# Patient Record
Sex: MALE | Race: WHITE | Employment: FULL TIME | URBAN - METROPOLITAN AREA
[De-identification: names, ages, dates, MRNs, and addresses within clinical notes are randomized per-mention and may not be internally consistent; named-entity substitution may affect disease eponyms.]

---

## 2019-12-25 ENCOUNTER — APPOINTMENT (OUTPATIENT)
Dept: INTERVENTIONAL RADIOLOGY/VASCULAR | Age: 64
DRG: 024 | End: 2019-12-25
Attending: NURSE PRACTITIONER
Payer: COMMERCIAL

## 2019-12-25 ENCOUNTER — HOSPITAL ENCOUNTER (INPATIENT)
Age: 64
LOS: 8 days | Discharge: HOME | DRG: 024 | End: 2020-01-02
Attending: EMERGENCY MEDICINE | Admitting: NEUROLOGICAL SURGERY
Payer: COMMERCIAL

## 2019-12-25 ENCOUNTER — APPOINTMENT (OUTPATIENT)
Dept: CT IMAGING | Age: 64
DRG: 024 | End: 2019-12-25
Attending: EMERGENCY MEDICINE
Payer: COMMERCIAL

## 2019-12-25 ENCOUNTER — APPOINTMENT (OUTPATIENT)
Dept: CT IMAGING | Age: 64
DRG: 024 | End: 2019-12-25
Attending: NURSE PRACTITIONER
Payer: COMMERCIAL

## 2019-12-25 DIAGNOSIS — Z79.4 CONTROLLED TYPE 2 DIABETES MELLITUS WITHOUT COMPLICATION, WITH LONG-TERM CURRENT USE OF INSULIN (HCC): ICD-10-CM

## 2019-12-25 DIAGNOSIS — E11.9 CONTROLLED TYPE 2 DIABETES MELLITUS WITHOUT COMPLICATION, WITH LONG-TERM CURRENT USE OF INSULIN (HCC): ICD-10-CM

## 2019-12-25 DIAGNOSIS — G45.0 TIA INVOLVING BASILAR ARTERY: Primary | ICD-10-CM

## 2019-12-25 DIAGNOSIS — I63.112: ICD-10-CM

## 2019-12-25 DIAGNOSIS — R42 DIZZINESS: ICD-10-CM

## 2019-12-25 LAB
ABO + RH BLD: NORMAL
ALBUMIN SERPL-MCNC: 3.4 G/DL (ref 3.2–4.6)
ALBUMIN SERPL-MCNC: 4.1 G/DL (ref 3.2–4.6)
ALBUMIN/GLOB SERPL: 1.2 {RATIO} (ref 1.2–3.5)
ALBUMIN/GLOB SERPL: 1.2 {RATIO} (ref 1.2–3.5)
ALP SERPL-CCNC: 67 U/L (ref 50–136)
ALP SERPL-CCNC: 87 U/L (ref 50–136)
ALT SERPL-CCNC: 22 U/L (ref 12–65)
ALT SERPL-CCNC: 31 U/L (ref 12–65)
AMPHET UR QL SCN: NEGATIVE
ANION GAP SERPL CALC-SCNC: 7 MMOL/L (ref 7–16)
ANION GAP SERPL CALC-SCNC: 8 MMOL/L (ref 7–16)
AST SERPL-CCNC: 11 U/L (ref 15–37)
AST SERPL-CCNC: 14 U/L (ref 15–37)
BARBITURATES UR QL SCN: NEGATIVE
BASOPHILS # BLD: 0.1 K/UL (ref 0–0.2)
BASOPHILS NFR BLD: 1 % (ref 0–2)
BENZODIAZ UR QL: NEGATIVE
BILIRUB DIRECT SERPL-MCNC: 0.2 MG/DL
BILIRUB SERPL-MCNC: 0.6 MG/DL (ref 0.2–1.1)
BILIRUB SERPL-MCNC: 0.7 MG/DL (ref 0.2–1.1)
BLOOD GROUP ANTIBODIES SERPL: NORMAL
BUN SERPL-MCNC: 18 MG/DL (ref 8–23)
BUN SERPL-MCNC: 20 MG/DL (ref 8–23)
CALCIUM SERPL-MCNC: 8.8 MG/DL (ref 8.3–10.4)
CALCIUM SERPL-MCNC: 9.9 MG/DL (ref 8.3–10.4)
CANNABINOIDS UR QL SCN: NEGATIVE
CHLORIDE SERPL-SCNC: 104 MMOL/L (ref 98–107)
CHLORIDE SERPL-SCNC: 108 MMOL/L (ref 98–107)
CHOLEST SERPL-MCNC: 108 MG/DL
CK MB CFR SERPL CALC: 4.9 %
CK MB SERPL-MCNC: 2.5 NG/ML (ref 0.5–3.6)
CK SERPL-CCNC: 51 U/L (ref 21–215)
CK SERPL-CCNC: 53 U/L (ref 21–215)
CO2 SERPL-SCNC: 27 MMOL/L (ref 21–32)
CO2 SERPL-SCNC: 29 MMOL/L (ref 21–32)
COCAINE UR QL SCN: NEGATIVE
CREAT SERPL-MCNC: 0.96 MG/DL (ref 0.8–1.5)
CREAT SERPL-MCNC: 1.28 MG/DL (ref 0.8–1.5)
DIFFERENTIAL METHOD BLD: ABNORMAL
EOSINOPHIL # BLD: 0.2 K/UL (ref 0–0.8)
EOSINOPHIL NFR BLD: 2 % (ref 0.5–7.8)
ERYTHROCYTE [DISTWIDTH] IN BLOOD BY AUTOMATED COUNT: 12.9 % (ref 11.9–14.6)
EST. AVERAGE GLUCOSE BLD GHB EST-MCNC: 192 MG/DL
GLOBULIN SER CALC-MCNC: 2.8 G/DL (ref 2.3–3.5)
GLOBULIN SER CALC-MCNC: 3.4 G/DL (ref 2.3–3.5)
GLUCOSE BLD STRIP.AUTO-MCNC: 217 MG/DL (ref 65–100)
GLUCOSE BLD STRIP.AUTO-MCNC: 221 MG/DL (ref 65–100)
GLUCOSE SERPL-MCNC: 224 MG/DL (ref 65–100)
GLUCOSE SERPL-MCNC: 236 MG/DL (ref 65–100)
HBA1C MFR BLD: 8.3 % (ref 4.8–6)
HCT VFR BLD AUTO: 52.6 % (ref 41.1–50.3)
HDLC SERPL-MCNC: 32 MG/DL (ref 40–60)
HDLC SERPL: 3.4 {RATIO}
HGB BLD-MCNC: 18.6 G/DL (ref 13.6–17.2)
IMM GRANULOCYTES # BLD AUTO: 0.1 K/UL (ref 0–0.5)
IMM GRANULOCYTES NFR BLD AUTO: 1 % (ref 0–5)
INR BLD: 1 (ref 0.9–1.2)
LDLC SERPL CALC-MCNC: 57.4 MG/DL
LIPID PROFILE,FLP: ABNORMAL
LYMPHOCYTES # BLD: 2.4 K/UL (ref 0.5–4.6)
LYMPHOCYTES NFR BLD: 34 % (ref 13–44)
MAGNESIUM SERPL-MCNC: 1.9 MG/DL (ref 1.8–2.4)
MCH RBC QN AUTO: 30.7 PG (ref 26.1–32.9)
MCHC RBC AUTO-ENTMCNC: 35.4 G/DL (ref 31.4–35)
MCV RBC AUTO: 86.8 FL (ref 79.6–97.8)
METHADONE UR QL: NEGATIVE
MONOCYTES # BLD: 0.5 K/UL (ref 0.1–1.3)
MONOCYTES NFR BLD: 7 % (ref 4–12)
NEUTS SEG # BLD: 3.9 K/UL (ref 1.7–8.2)
NEUTS SEG NFR BLD: 55 % (ref 43–78)
NRBC # BLD: 0 K/UL (ref 0–0.2)
OPIATES UR QL: NEGATIVE
PCP UR QL: NEGATIVE
PLATELET # BLD AUTO: 205 K/UL (ref 150–450)
PMV BLD AUTO: 9.4 FL (ref 9.4–12.3)
POTASSIUM SERPL-SCNC: 4 MMOL/L (ref 3.5–5.1)
POTASSIUM SERPL-SCNC: 4.3 MMOL/L (ref 3.5–5.1)
PROT SERPL-MCNC: 6.2 G/DL (ref 6.3–8.2)
PROT SERPL-MCNC: 7.5 G/DL (ref 6.3–8.2)
PT BLD: 12.4 SECS (ref 9.6–11.6)
RBC # BLD AUTO: 6.06 M/UL (ref 4.23–5.6)
SODIUM SERPL-SCNC: 141 MMOL/L (ref 136–145)
SODIUM SERPL-SCNC: 142 MMOL/L (ref 136–145)
SPECIMEN EXP DATE BLD: NORMAL
TRIGL SERPL-MCNC: 93 MG/DL (ref 35–150)
TROPONIN I SERPL-MCNC: <0.02 NG/ML (ref 0.02–0.05)
VLDLC SERPL CALC-MCNC: 18.6 MG/DL (ref 6–23)
WBC # BLD AUTO: 7 K/UL (ref 4.3–11.1)

## 2019-12-25 PROCEDURE — 86900 BLOOD TYPING SEROLOGIC ABO: CPT

## 2019-12-25 PROCEDURE — 70450 CT HEAD/BRAIN W/O DYE: CPT

## 2019-12-25 PROCEDURE — C1769 GUIDE WIRE: HCPCS

## 2019-12-25 PROCEDURE — 74011636637 HC RX REV CODE- 636/637: Performed by: NURSE PRACTITIONER

## 2019-12-25 PROCEDURE — 74011000258 HC RX REV CODE- 258: Performed by: EMERGENCY MEDICINE

## 2019-12-25 PROCEDURE — B312ZZZ FLUOROSCOPY OF LEFT SUBCLAVIAN ARTERY: ICD-10-PCS | Performed by: NEUROLOGICAL SURGERY

## 2019-12-25 PROCEDURE — 74011636320 HC RX REV CODE- 636/320: Performed by: EMERGENCY MEDICINE

## 2019-12-25 PROCEDURE — C1894 INTRO/SHEATH, NON-LASER: HCPCS

## 2019-12-25 PROCEDURE — L1830 KO IMMOB CANVAS LONG PRE OTS: HCPCS

## 2019-12-25 PROCEDURE — 82962 GLUCOSE BLOOD TEST: CPT

## 2019-12-25 PROCEDURE — 85610 PROTHROMBIN TIME: CPT

## 2019-12-25 PROCEDURE — 36225 PLACE CATH SUBCLAVIAN ART: CPT

## 2019-12-25 PROCEDURE — 65610000001 HC ROOM ICU GENERAL

## 2019-12-25 PROCEDURE — B31FZZZ FLUOROSCOPY OF LEFT VERTEBRAL ARTERY: ICD-10-PCS | Performed by: NEUROLOGICAL SURGERY

## 2019-12-25 PROCEDURE — 74011250636 HC RX REV CODE- 250/636

## 2019-12-25 PROCEDURE — 74011250636 HC RX REV CODE- 250/636: Performed by: NURSE PRACTITIONER

## 2019-12-25 PROCEDURE — 74011250637 HC RX REV CODE- 250/637: Performed by: NURSE PRACTITIONER

## 2019-12-25 PROCEDURE — 037Q3ZZ DILATION OF LEFT VERTEBRAL ARTERY, PERCUTANEOUS APPROACH: ICD-10-PCS | Performed by: NEUROLOGICAL SURGERY

## 2019-12-25 PROCEDURE — C1725 CATH, TRANSLUMIN NON-LASER: HCPCS

## 2019-12-25 PROCEDURE — 0042T CT PERF W CBF: CPT

## 2019-12-25 PROCEDURE — 80061 LIPID PANEL: CPT

## 2019-12-25 PROCEDURE — 77030013519 HC DEV INFL BASIX MRTM -B

## 2019-12-25 PROCEDURE — 80307 DRUG TEST PRSMV CHEM ANLYZR: CPT

## 2019-12-25 PROCEDURE — 83735 ASSAY OF MAGNESIUM: CPT

## 2019-12-25 PROCEDURE — 70496 CT ANGIOGRAPHY HEAD: CPT

## 2019-12-25 PROCEDURE — 77030019605

## 2019-12-25 PROCEDURE — 80053 COMPREHEN METABOLIC PANEL: CPT

## 2019-12-25 PROCEDURE — 85025 COMPLETE CBC W/AUTO DIFF WBC: CPT

## 2019-12-25 PROCEDURE — 84484 ASSAY OF TROPONIN QUANT: CPT

## 2019-12-25 PROCEDURE — 77030004566 HC CATH ANGI DX TORCON COOK -B

## 2019-12-25 PROCEDURE — C1760 CLOSURE DEV, VASC: HCPCS

## 2019-12-25 PROCEDURE — 82550 ASSAY OF CK (CPK): CPT

## 2019-12-25 PROCEDURE — 82553 CREATINE MB FRACTION: CPT

## 2019-12-25 PROCEDURE — C1757 CATH, THROMBECTOMY/EMBOLECT: HCPCS

## 2019-12-25 PROCEDURE — 77030040361 HC SLV COMPR DVT MDII -B

## 2019-12-25 PROCEDURE — 36415 COLL VENOUS BLD VENIPUNCTURE: CPT

## 2019-12-25 PROCEDURE — 80076 HEPATIC FUNCTION PANEL: CPT

## 2019-12-25 PROCEDURE — C1887 CATHETER, GUIDING: HCPCS

## 2019-12-25 PROCEDURE — 83036 HEMOGLOBIN GLYCOSYLATED A1C: CPT

## 2019-12-25 PROCEDURE — 93005 ELECTROCARDIOGRAM TRACING: CPT | Performed by: EMERGENCY MEDICINE

## 2019-12-25 PROCEDURE — 03CQ3ZZ EXTIRPATION OF MATTER FROM LEFT VERTEBRAL ARTERY, PERCUTANEOUS APPROACH: ICD-10-PCS | Performed by: NEUROLOGICAL SURGERY

## 2019-12-25 PROCEDURE — 99285 EMERGENCY DEPT VISIT HI MDM: CPT | Performed by: EMERGENCY MEDICINE

## 2019-12-25 PROCEDURE — 77030012468 HC VLV BLEEDBK CNTRL ABBT -B

## 2019-12-25 PROCEDURE — 77030020263 HC SOL INJ SOD CL0.9% LFCR 1000ML

## 2019-12-25 RX ORDER — ASPIRIN 325 MG
325 TABLET ORAL DAILY
Status: DISCONTINUED | OUTPATIENT
Start: 2019-12-25 | End: 2019-12-30

## 2019-12-25 RX ORDER — FENTANYL CITRATE 50 UG/ML
25-50 INJECTION, SOLUTION INTRAMUSCULAR; INTRAVENOUS
Status: DISCONTINUED | OUTPATIENT
Start: 2019-12-25 | End: 2019-12-25

## 2019-12-25 RX ORDER — ATORVASTATIN CALCIUM 40 MG/1
40 TABLET, FILM COATED ORAL DAILY
Status: DISCONTINUED | OUTPATIENT
Start: 2019-12-26 | End: 2020-01-02 | Stop reason: HOSPADM

## 2019-12-25 RX ORDER — LISINOPRIL 20 MG/1
20 TABLET ORAL DAILY
Status: DISCONTINUED | OUTPATIENT
Start: 2019-12-26 | End: 2020-01-02 | Stop reason: HOSPADM

## 2019-12-25 RX ORDER — CLOPIDOGREL BISULFATE 75 MG/1
75 TABLET ORAL DAILY
Status: DISCONTINUED | OUTPATIENT
Start: 2019-12-25 | End: 2019-12-30

## 2019-12-25 RX ORDER — SODIUM CHLORIDE 0.9 % (FLUSH) 0.9 %
10 SYRINGE (ML) INJECTION
Status: COMPLETED | OUTPATIENT
Start: 2019-12-25 | End: 2019-12-25

## 2019-12-25 RX ORDER — ACETAMINOPHEN 325 MG/1
650 TABLET ORAL
Status: DISCONTINUED | OUTPATIENT
Start: 2019-12-25 | End: 2020-01-02 | Stop reason: HOSPADM

## 2019-12-25 RX ORDER — ONDANSETRON 2 MG/ML
4 INJECTION INTRAMUSCULAR; INTRAVENOUS
Status: DISCONTINUED | OUTPATIENT
Start: 2019-12-25 | End: 2020-01-02 | Stop reason: HOSPADM

## 2019-12-25 RX ORDER — HYDROCODONE BITARTRATE AND ACETAMINOPHEN 7.5; 325 MG/1; MG/1
1 TABLET ORAL
Status: DISCONTINUED | OUTPATIENT
Start: 2019-12-25 | End: 2020-01-02 | Stop reason: HOSPADM

## 2019-12-25 RX ORDER — ONDANSETRON 2 MG/ML
8 INJECTION INTRAMUSCULAR; INTRAVENOUS ONCE
Status: COMPLETED | OUTPATIENT
Start: 2019-12-25 | End: 2019-12-25

## 2019-12-25 RX ORDER — ONDANSETRON 2 MG/ML
INJECTION INTRAMUSCULAR; INTRAVENOUS
Status: COMPLETED
Start: 2019-12-25 | End: 2019-12-25

## 2019-12-25 RX ADMIN — ONDANSETRON 8 MG: 2 INJECTION INTRAMUSCULAR; INTRAVENOUS at 19:24

## 2019-12-25 RX ADMIN — FENTANYL CITRATE 50 MCG: 50 INJECTION, SOLUTION INTRAMUSCULAR; INTRAVENOUS at 17:57

## 2019-12-25 RX ADMIN — Medication 10 ML: at 16:00

## 2019-12-25 RX ADMIN — INSULIN HUMAN 2 UNITS: 100 INJECTION, SOLUTION PARENTERAL at 19:29

## 2019-12-25 RX ADMIN — IOPAMIDOL 100 ML: 755 INJECTION, SOLUTION INTRAVENOUS at 16:00

## 2019-12-25 RX ADMIN — SODIUM CHLORIDE 100 ML: 900 INJECTION, SOLUTION INTRAVENOUS at 16:00

## 2019-12-25 RX ADMIN — CLOPIDOGREL BISULFATE 75 MG: 75 TABLET ORAL at 20:24

## 2019-12-25 RX ADMIN — FENTANYL CITRATE 50 MCG: 50 INJECTION, SOLUTION INTRAMUSCULAR; INTRAVENOUS at 17:15

## 2019-12-25 RX ADMIN — ASPIRIN 325 MG ORAL TABLET 325 MG: 325 PILL ORAL at 20:24

## 2019-12-25 NOTE — H&P
History and Physical    Patient: Kay Vigil MRN: [id number]  SSN: [id number]    YOB: 1955  Age: 59 y.o. Sex: male      Subjective: Kay Vigil is a 59 y.o. male R hand dominant, who presented to the [facility] around 1500 today with right sided weakness, dysarthria and \"thick speech\". Last Known normal was 1400 today. The pt states he was on computer at home and he was unable to use his right hand to move computer mouse, he then noted his right leg to be weak, \"felt like a noodle\" and he was brought into ED by his wife JUNIOR. The pt had initial NIHSS 1 for dysarthria, head CT neg for any acute ICH. The pt was seen by our Tele Neuro team and deemed not a candidate for Activase because they felt the pt was having a TIA. The pt was re-evaluated by ED Dr. Maikol Moseley and pt was unable to ambulate in the ED, he became very dizzy and nauseated. A CTA/CTP was obtained and evaluated by Dr. Yuri Berman, pt found to have left vert clot at origin with stenosis, pt candidate for MEEK and taken emergently for mechanical thrombectomy. Consent obtained prior to procedure with Dr. Larry Nagy.       Past Medical History:   Diagnosis Date    [facility] [address]     Hypertension     MI (myocardial infarction) ([facility] [address] 2002    100% blockage in right coronary artery. Past Surgical History:   Procedure Laterality Date    HX CORONARY STENT PLACEMENT  2002    right coronary stent      Family History   Problem Relation Age of Onset    Heart Disease Father     Heart Attack Father      Social History     Tobacco Use    Smoking status: Former Smoker    Smokeless tobacco: Former User   Substance Use Topics    Alcohol use: No      Prior to Admission medications    Medication Sig Start Date End Date Taking? Authorizing Provider   aspirin delayed-release 81 mg tablet Take  by mouth daily. Provider, Historical   atorvastatin (LIPITOR) 40 mg tablet Take 1 Tab by mouth daily.  11/21/19   Yamila Valenzuela MD   empagliflozin (JARDIANCE) 10 mg tablet Take 1 Tab by mouth daily. 11/21/19   Garo Snow MD   metoprolol succinate (TOPROL-XL) 100 mg tablet TAKE 1 TABLET DAILY 5/9/19   Dutch Wall MD   metFORMIN (GLUCOPHAGE) 500 mg tablet Take 1 Tab by mouth two (2) times daily (with meals). 11/1/18   Dutch Wall MD   lisinopril (PRINIVIL, ZESTRIL) 20 mg tablet Take 1 Tab by mouth daily. 11/1/18   Dutch Wall MD   nitroglycerin (NITROSTAT) 0.4 mg SL tablet 1 Tab by SubLINGual route every five (5) minutes as needed for Chest Pain. 2/2/18   Garo Snow MD   Blood-Glucose Meter monitoring kit TEST DAILY 1/22/18   Dutch Wall MD   glucose blood VI test strips (ASCENSIA AUTODISC VI, ONE TOUCH ULTRA TEST VI) strip TEST DAILY 1/22/18   Dutch Wall MD   Lancets misc TEST DAILY 1/22/18   Dutch Wall MD        Allergies   Allergen Reactions    Sulfur Unknown (comments)       Review of Systems:  Constitutional: well nourished male in NAD  Eyes:  no change in visual acuity, no photophobia  Ears, nose, mouth, throat, and face: no  Odynphagia, dysphagia, no thrush or exudate, negative for chronic sinus congestion, recurrent headaches  Respiratory: negative for SOB, hemoptysis or cough  Cardiovascular: negative for CP, palpitations, or PND  Gastrointestinal: negative for abdominal pain, no hematemesis, hematochezia or BRBPR  Genitourinary: no urgency, frequency, or dysuria, no nocturia  Integument/breast: negative for skin rash or skin lesions  Hematologic/lymphatic: negative for known bleeding disorder  Musculoskeletal:negative for joint pain or joint tenderness  Neurological: dizziness, nausea, right arm \"heaviness\", right leg weak with slurred speech.    Behavioral/Psych: negative for depression or chronic anxiety,       Objective:     Vitals:    12/25/19 1740 12/25/19 1745 12/25/19 1750 12/25/19 1755   BP: 134/69 142/73 130/74 133/71   Pulse: 71 65 69 69   Resp: 16 14 16 16   Temp: SpO2: 96% 96% 95% 95%   Weight:       Height:            Physical Exam:  General:  Alert, cooperative, no distress, appears stated age. HEENT: Supple, symmetrical, trachea midline, no adenopathy, thyroid: no enlargment/tenderness/nodules, no carotid bruit and no JVD. +6th nerve R.   Lungs:   Clear to auscultation bilaterally. Heart:  Regular rate and rhythm, S1, S2 normal, no murmur, click, rub or gallop. Abdomen:   Soft, non-tender. Bowel sounds normal. No masses,  No organomegaly. Extremities: Extremities normal, atraumatic, no cyanosis or edema. Pulses: 2+ and symmetric all extremities. Skin: Skin color, texture, turgor normal. No rashes or lesions   Neurologic: Pt aox3, will follow commands. RUE mild drift, pt states paresthesia. Speech mild dysarthria. 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 Pt with left vert artery stenosis and thrombus. Pt NIHSS 1-2 on admit and remains 1 on admit to ICU. Pt is aox3, follows commands and zarco. Pt does have noted fine motor deficits on RUE and speech is \"thick\". Pt is able to swallow and maintain airway. PT/OT/ST consulted. Post MEEK CT head is stable. Pt with dissection left vert during procedure. Pt started on asa/plavix. Will repeat imaging in am: CT head/CTA head and neck and CTP. Will order MRI brain. RESP:pt tolerating room air. CV:SBP goal <180. Trop neg. 2D Echo pending. Trop neg. SCD. Asa/plavix. HEME: HH: 10/52,   NEPH: bun/cr: 20/1.28  GI:NPO on admit. Will do STAND for swallow eval, ST consulted. IVF's @75cc/hr. ID:afebrile, no abx  LINES:IVx2, bland. Dr. Diaz Esposito discussed plan of care with pt and wife.      Signed By: Zahraa Barrett NP     December 25, 2019

## 2019-12-25 NOTE — PROGRESS NOTES
Sachi Monreal [address] [facility]      NEUROVASCULAR SURGERY - ATTENDING PROGRESS NOTE    I have personally seen and examined the patient. I spoke to wife and son regarding radiographic diagnosis of occlusion of the left vertebral artery at the origin, which is his dominant vertebral artery. He continues to exhibit some posterior circulation symptoms but it is unclear whether these symptoms are from hypoperfusion or  infarcts. Given his young age, I recommended proceeding with revascularization of his left vertebral artery origin. There appears to be an associated stenosis. Wife agrees to proceed.      Yecenia Verduzco MD, Nani Shearer, [facility]   Neurological Surgery  Cerebrovascular and [facility]

## 2019-12-25 NOTE — PROCEDURES
Sachi Monreal [address] [facility]      NEUROVASCULAR SURGERY - OPERATIVE NOTE    PRE-OPERATIVE DIAGNOSIS:  Left vertebral artery occlusion    POST-OPERATIVE DIAGNOSIS:  Left vertebral artery occlusion    PROCEDURE(S) PERFORMED:  Right common femoral artery access  Left subclavian artery angiogram  Left vertebral artery angiogram  Endovascular mechanical thrombectomy, left vertebral artery  Angioplasty, left vertebral artery  Date of Service: 12/25/2019    Surgeon(s):  Natalie Azar MD    Assistant(s):  Fritz Troy MSN, [facility]    Anesthesia: Conscious sedation, 45 minutes. I personally supervised administration of sedation. Medications: No heparin used. Contrast: Omnipaque 120 mL    Access: Right common femoral artery, 6F sheath. Arteriotomy closed with 8FAngioseal. Flat x 2 hours. Implants: none     INDICATIONS: Mr. Marion Monroe is a 59y.o. year-old male who presents with stroke symptoms and was found to have occlusion of his left vertebral artery. I am indicating him for emergent endovascular thrombectomy to try to restore blood flow to the brain. I spoke to the patient and his family regarding the details of the procedure, the associated risks and potential complications, and the anticipated post-operative course. I answered their questions and they have elected to proceed. The surgical consent was signed and placed in the chart. FINDINGS: Acute occlusion of the origin of the left vertebral artery in the setting of a critical stenosis. This was easily revascularized with aspiration using a single pass to [address] [facility]. Once this was done, the 90% stenosis remained and this was opened with a 4 x 15 mm Viatrac balloon. A small non-flow limiting dissection was noted after angioplasty. Good antegrade flow is demonstrated at the conclusion of the procedure. COMPLICATIONS: None.     EQUIPMENT USED:   1. 6F short sheath  2. 6F Flexor shuttle sheath, 90 cm  3. 5F diagnostic catheter, 125 cm  4. 0.038\" Glidewire, 260 cm  5. 0.035\" Glidewire, 150 cm  6. 4 x 15 mm Viatrac balloon  7. Synchro2 wire  8. 8F Angioseal    PROCEDURAL DETAILS: After the patient was properly identified in the pre-operative area, they were transferred into the operating room and placed supine on the operating table. All pressure points were padded appropriately and the patient was connected to the physiologic monitoring system. After verifying the radiologic equipment to be in working order, the right and left groin areas were prepped and draped in the standard surgical fashion. A surgical team pause was then performed with all team members in agreement. After infiltration of local anesthetic, the right common femoral artery was then accessed with a 21 gauge needle. The arteriotomy was then dilated up to a 6F sheath, which was then flushed and attached to a pressurized heparinized saline drip. This was then exchanged for a 6F shuttle into the descending thoracic aorta and I flushed the system. Using the diagnostic catheter, the left subclavian artery was then catheterized. Digital subtraction angiography was then performed at this time of the cervical and intracranial  circulation, demonstrating the occlusion at the origin of the left vertebral artery. I then used the Glidewire and diagnostic catheter to gently pass through the occlusion and brought the shuttle to the face of the occlusion and performed aspiration. This opened up the vertebral artery and demonstrated the 90% stenosis at its origin. I was then able to cross the stenosis with a 4 x 15 mm Viatrac balloon over a Synchro2 wire and serially dilate the stenosis. Post-angioplasty control angiography demonstrates a small non-flow limiting dissection with brisk antegrade flow. At the conclusion of the procedure, an external iliac angiogram was performed demonstrating a puncture site suitable for closure.  The Angioseal was then deployed without difficulty. Adequate hemostasis was obtained and the wound was dressed in the standard sterile fashion. The patient tolerated the procedure well and was transported out of the operating room in stable condition. DIAGNOSTIC ANGIOGRAPHY AND INTERVENTION, SUPERVISION AND INTERPRETATION:    LEFT SUBCLAVIAN ARTERY: Under fluoroscopic guidance, the diagnostic catheter was navigated from the aortic arch into the left subclavian artery and DSA performed in the AP plane. The left vertebral artery is not visualized, consistent with an acute occlusion. The thyrocervical and deep cervical trunks are visualized and without abnormality. LEFT VERTEBRAL ARTERY: Under fluoroscopic guidance, the diagnostic catheter was navigated into the left vertebral artery. Digital subtraction angiography was then performed. This demonstrates brisk antegrade flow into the intracranial circulation. No aneurysms are identified. There is no venous shunting. The anatomic course of the basilar artery and named cerebellar arteries appear normal. The left vertebral artery is dominant. The capillary phase and venous structures appear without significant abnormality. THROMBECTOMY: The aspiration catheter was brought to the face of the thrombus and aspiration performed. CONTROL ANGIOGRAPHY:  First control angiogram from the left subclavian artery demonstrates antegrade flow and opening of the left vertebral artery but with a 90% stenosis at the origin. The second control angiogram is performed after angioplasty demonstrating marked reduction of the stenosis and brisk antegrade flow. There appears to be a small dissection stemming from the origin of the vertebral artery into the proximal V2 segment that is not flow limiting. ANGIOPLASTY: The stenosis is crossed with a Synchro2 wire and a 4 x 15 mm Viatrac balloon and angioplasty performed.     EXTERNAL ILIAC ARTERY: The sheath is then pulled back into the external iliac artery and digital subtraction angiography is performed. This demonstrates an arteriotomy site that is above the femoral bifurcation and below the inferior epigastric artery. No extravasation is noted. No aneurysms are noted. The anatomy here is relatively free of significant atheromatous disease. The arteriotomy site appears amenable to the use of a closure device.      Wagner Russell MD, Frank Roque, [facility]   Neurological Surgery  Cerebrovascular and [address] [facility]

## 2019-12-25 NOTE — PROGRESS NOTES
Pt taken to CT for [address] [facility] [address]    1521 CT completed. Pt taken back to ER for tele neuro evaluation. Pt NIH 1 for dysarthria. 1537  Tele neuro consult started. [address] consult completed. Dr. Aishwarya Merida to bedside. Attempted to ambulate patient. Pt ataxic and unable to release bed rail to walk. Cristine Hoffmann, notified. Recommendation obtained for CTA/CTP. Dr. Aishwarya Merida notified and studies ordered.

## 2019-12-25 NOTE — ED TRIAGE NOTES
Pt arrives POV with wife. States about an hour ago pt started to have trouble speaking. Pt states he feels like his right arm is getting raul but that his leg (right) is on. Pt also having slurred speech noted in triage.  Dr. Olivia Goyal evaluating

## 2019-12-25 NOTE — ED PROVIDER NOTES
Pt arrives POV with wife. States about an hour ago pt started to have trouble speaking. Pt states he feels like his right arm is getting raul but that his leg (right) is on. Patient and spouse deny prior occurrence. The patient states he feels as though his right leg is not working right at this time. He denies any headache or vision changes or recent changes in medications. He does have a history of coronary artery disease with stent placement in the past.  He also has a history of diabetes and is not currently taking any anticoagulants. The history is provided by the patient, the spouse and medical records. Extremity Weakness    This is a new problem. The current episode started less than 1 hour ago. The problem has been gradually improving. The pain is present in the right arm, right lower leg and right upper leg. The patient is experiencing no pain. Pertinent negatives include no numbness, full range of motion, no stiffness, no tingling, no itching, no back pain and no neck pain. He has tried nothing for the symptoms. The treatment provided no relief. There has been no history of extremity trauma. Past Medical History:   Diagnosis Date    Diabetes ([address]     Hypertension     MI (myocardial infarction) ([facility] [address] 2002    100% blockage in right coronary artery.        Past Surgical History:   Procedure Laterality Date    HX CORONARY STENT PLACEMENT  2002    right coronary stent         Family History:   Problem Relation Age of Onset    Heart Disease Father     Heart Attack Father        Social History     Socioeconomic History    Marital status:      Spouse name: Not on file    Number of children: Not on file    Years of education: Not on file    Highest education level: Not on file   Occupational History    Not on file   Social Needs    Financial resource strain: Not on file    Food insecurity:     Worry: Not on file     Inability: Not on file    Transportation needs:     Medical: Not on file     Non-medical: Not on file   Tobacco Use    Smoking status: Former Smoker    Smokeless tobacco: Former User   Substance and Sexual Activity    Alcohol use: No    Drug use: No    Sexual activity: Not on file   Lifestyle    Physical activity:     Days per week: Not on file     Minutes per session: Not on file    Stress: Not on file   Relationships    Social connections:     Talks on phone: Not on file     Gets together: Not on file     Attends [employer] service: Not on file     Active member of club or organization: Not on file     Attends meetings of clubs or organizations: Not on file     Relationship status: Not on file    Intimate partner violence:     Fear of current or ex partner: Not on file     Emotionally abused: Not on file     Physically abused: Not on file     Forced sexual activity: Not on file   Other Topics Concern    Not on file   Social History Narrative    Not on file         ALLERGIES: Sulfur    Review of Systems   Musculoskeletal: Negative for back pain, neck pain and stiffness. Skin: Negative for itching. Neurological: Positive for speech difficulty and weakness. Negative for tingling and numbness. All other systems reviewed and are negative. Vitals:    12/25/19 1517   BP: (!) 167/99   Pulse: 72   Resp: 18   Temp: 98.5 °F (36.9 °C)   SpO2: 96%   Weight: 102.1 kg (225 lb)   Height: 6' 1\" (1.854 m)            Physical Exam  Vitals signs and nursing note reviewed. Constitutional:       General: He is not in acute distress. Appearance: Normal appearance. He is normal weight. He is not ill-appearing, toxic-appearing or diaphoretic. HENT:      Head: Normocephalic and atraumatic. Nose: Nose normal.      Mouth/Throat:      Mouth: Mucous membranes are moist.   Eyes:      Extraocular Movements: Extraocular movements intact. Conjunctiva/sclera: Conjunctivae normal.      Pupils: Pupils are equal, round, and reactive to light.    Neck:      Musculoskeletal: Normal range of motion and neck supple. Cardiovascular:      Rate and Rhythm: Normal rate and regular rhythm. Pulses: Normal pulses. Heart sounds: Normal heart sounds. Pulmonary:      Effort: Pulmonary effort is normal.      Breath sounds: Normal breath sounds. Abdominal:      General: Abdomen is flat. Tenderness: There is no tenderness. Musculoskeletal: Normal range of motion. Skin:     General: Skin is warm and dry. Neurological:      General: No focal deficit present. Mental Status: He is alert and oriented to person, place, and time. Mental status is at baseline. Cranial Nerves: No cranial nerve deficit. Sensory: No sensory deficit. Motor: Weakness present. Coordination: Coordination abnormal.      Gait: Gait abnormal.      Deep Tendon Reflexes: Reflexes normal.      Comments: Left lower extremity weakness is noted. There appears to be no drift of the upper extremities or incoordination. No dysarthria noted.    Psychiatric:         Mood and Affect: Mood normal.         Behavior: Behavior normal.          MDM  Number of Diagnoses or Management Options     Amount and/or Complexity of Data Reviewed  Clinical lab tests: ordered and reviewed  Tests in the radiology section of CPT®: ordered and reviewed  Review and summarize past medical records: yes  Discuss the patient with other providers: yes  Independent visualization of images, tracings, or specimens: yes    Risk of Complications, Morbidity, and/or Mortality  Presenting problems: high  Diagnostic procedures: moderate  Management options: moderate    Patient Progress  Patient progress: stable         Procedures

## 2019-12-25 NOTE — PROGRESS NOTES
[facility] Dual nuero assessment complete with Merle bender rn and carlee, rn and offgoing rn. ST NAKITA NP at bedside with patient and family.  [facility] 1

## 2019-12-25 NOTE — PROGRESS NOTES
Pt to IR holding bay with this RN and JACKY Campos RN. Awaiting results of CTA/CTP. NIH remains 1 for mild dysarthria. Pt report dizziness at this time with position change. Pedal pulses marked. 20 g iv placed to left FA.

## 2019-12-26 ENCOUNTER — APPOINTMENT (OUTPATIENT)
Dept: CT IMAGING | Age: 64
DRG: 024 | End: 2019-12-26
Attending: NURSE PRACTITIONER
Payer: COMMERCIAL

## 2019-12-26 ENCOUNTER — APPOINTMENT (OUTPATIENT)
Dept: MRI IMAGING | Age: 64
DRG: 024 | End: 2019-12-26
Attending: NURSE PRACTITIONER
Payer: COMMERCIAL

## 2019-12-26 LAB
ANION GAP SERPL CALC-SCNC: 6 MMOL/L (ref 7–16)
ATRIAL RATE: 76 BPM
BUN SERPL-MCNC: 15 MG/DL (ref 8–23)
CALCIUM SERPL-MCNC: 9.1 MG/DL (ref 8.3–10.4)
CALCULATED P AXIS, ECG09: 62 DEGREES
CALCULATED R AXIS, ECG10: -52 DEGREES
CALCULATED T AXIS, ECG11: 8 DEGREES
CHLORIDE SERPL-SCNC: 109 MMOL/L (ref 98–107)
CO2 SERPL-SCNC: 27 MMOL/L (ref 21–32)
CREAT SERPL-MCNC: 0.92 MG/DL (ref 0.8–1.5)
DIAGNOSIS, 93000: NORMAL
ERYTHROCYTE [DISTWIDTH] IN BLOOD BY AUTOMATED COUNT: 13 % (ref 11.9–14.6)
GLUCOSE BLD STRIP.AUTO-MCNC: 135 MG/DL (ref 65–100)
GLUCOSE BLD STRIP.AUTO-MCNC: 143 MG/DL (ref 65–100)
GLUCOSE BLD STRIP.AUTO-MCNC: 165 MG/DL (ref 65–100)
GLUCOSE BLD STRIP.AUTO-MCNC: 165 MG/DL (ref 65–100)
GLUCOSE BLD STRIP.AUTO-MCNC: 312 MG/DL (ref 65–100)
GLUCOSE SERPL-MCNC: 135 MG/DL (ref 65–100)
HCT VFR BLD AUTO: 44.3 % (ref 41.1–50.3)
HGB BLD-MCNC: 15.2 G/DL (ref 13.6–17.2)
MAGNESIUM SERPL-MCNC: 2.2 MG/DL (ref 1.8–2.4)
MCH RBC QN AUTO: 30 PG (ref 26.1–32.9)
MCHC RBC AUTO-ENTMCNC: 34.3 G/DL (ref 31.4–35)
MCV RBC AUTO: 87.5 FL (ref 79.6–97.8)
NRBC # BLD: 0 K/UL (ref 0–0.2)
P-R INTERVAL, ECG05: 150 MS
PLATELET # BLD AUTO: 186 K/UL (ref 150–450)
PMV BLD AUTO: 9.8 FL (ref 9.4–12.3)
POTASSIUM SERPL-SCNC: 3.8 MMOL/L (ref 3.5–5.1)
Q-T INTERVAL, ECG07: 388 MS
QRS DURATION, ECG06: 108 MS
QTC CALCULATION (BEZET), ECG08: 436 MS
RBC # BLD AUTO: 5.06 M/UL (ref 4.23–5.6)
SODIUM SERPL-SCNC: 142 MMOL/L (ref 136–145)
VENTRICULAR RATE, ECG03: 76 BPM
WBC # BLD AUTO: 8.8 K/UL (ref 4.3–11.1)

## 2019-12-26 PROCEDURE — C8929 TTE W OR WO FOL WCON,DOPPLER: HCPCS

## 2019-12-26 PROCEDURE — 70551 MRI BRAIN STEM W/O DYE: CPT

## 2019-12-26 PROCEDURE — 74011250636 HC RX REV CODE- 250/636: Performed by: NURSE PRACTITIONER

## 2019-12-26 PROCEDURE — 74011250636 HC RX REV CODE- 250/636: Performed by: NEUROLOGICAL SURGERY

## 2019-12-26 PROCEDURE — 36415 COLL VENOUS BLD VENIPUNCTURE: CPT

## 2019-12-26 PROCEDURE — 74011000250 HC RX REV CODE- 250: Performed by: NURSE PRACTITIONER

## 2019-12-26 PROCEDURE — 0042T CT PERF W CBF: CPT

## 2019-12-26 PROCEDURE — 97166 OT EVAL MOD COMPLEX 45 MIN: CPT

## 2019-12-26 PROCEDURE — 97162 PT EVAL MOD COMPLEX 30 MIN: CPT

## 2019-12-26 PROCEDURE — 51798 US URINE CAPACITY MEASURE: CPT

## 2019-12-26 PROCEDURE — 97112 NEUROMUSCULAR REEDUCATION: CPT

## 2019-12-26 PROCEDURE — 74011636320 HC RX REV CODE- 636/320: Performed by: NEUROLOGICAL SURGERY

## 2019-12-26 PROCEDURE — 97530 THERAPEUTIC ACTIVITIES: CPT

## 2019-12-26 PROCEDURE — 83735 ASSAY OF MAGNESIUM: CPT

## 2019-12-26 PROCEDURE — 97116 GAIT TRAINING THERAPY: CPT

## 2019-12-26 PROCEDURE — 74011000250 HC RX REV CODE- 250: Performed by: NEUROLOGICAL SURGERY

## 2019-12-26 PROCEDURE — 65610000001 HC ROOM ICU GENERAL

## 2019-12-26 PROCEDURE — 77030019905 HC CATH URETH INTMIT MDII -A

## 2019-12-26 PROCEDURE — 70496 CT ANGIOGRAPHY HEAD: CPT

## 2019-12-26 PROCEDURE — 85027 COMPLETE CBC AUTOMATED: CPT

## 2019-12-26 PROCEDURE — 82962 GLUCOSE BLOOD TEST: CPT

## 2019-12-26 PROCEDURE — 77030020263 HC SOL INJ SOD CL0.9% LFCR 1000ML

## 2019-12-26 PROCEDURE — 74011250637 HC RX REV CODE- 250/637: Performed by: NURSE PRACTITIONER

## 2019-12-26 PROCEDURE — 74011000258 HC RX REV CODE- 258: Performed by: NEUROLOGICAL SURGERY

## 2019-12-26 PROCEDURE — 74011636637 HC RX REV CODE- 636/637: Performed by: NURSE PRACTITIONER

## 2019-12-26 PROCEDURE — 80048 BASIC METABOLIC PNL TOTAL CA: CPT

## 2019-12-26 RX ORDER — ENOXAPARIN SODIUM 100 MG/ML
40 INJECTION SUBCUTANEOUS EVERY 24 HOURS
Status: DISCONTINUED | OUTPATIENT
Start: 2019-12-26 | End: 2020-01-02 | Stop reason: HOSPADM

## 2019-12-26 RX ORDER — METOCLOPRAMIDE HYDROCHLORIDE 5 MG/ML
10 INJECTION INTRAMUSCULAR; INTRAVENOUS ONCE
Status: COMPLETED | OUTPATIENT
Start: 2019-12-26 | End: 2019-12-26

## 2019-12-26 RX ORDER — MECLIZINE HYDROCHLORIDE 25 MG/1
25 TABLET ORAL EVERY 6 HOURS
Status: COMPLETED | OUTPATIENT
Start: 2019-12-26 | End: 2019-12-29

## 2019-12-26 RX ORDER — SODIUM CHLORIDE 9 MG/ML
75 INJECTION, SOLUTION INTRAVENOUS CONTINUOUS
Status: DISCONTINUED | OUTPATIENT
Start: 2019-12-26 | End: 2020-01-02 | Stop reason: HOSPADM

## 2019-12-26 RX ORDER — ONDANSETRON 2 MG/ML
8 INJECTION INTRAMUSCULAR; INTRAVENOUS ONCE
Status: COMPLETED | OUTPATIENT
Start: 2019-12-26 | End: 2019-12-26

## 2019-12-26 RX ORDER — SODIUM CHLORIDE 0.9 % (FLUSH) 0.9 %
10 SYRINGE (ML) INJECTION
Status: COMPLETED | OUTPATIENT
Start: 2019-12-26 | End: 2019-12-26

## 2019-12-26 RX ORDER — SENNOSIDES 8.6 MG/1
2 TABLET ORAL
Status: DISCONTINUED | OUTPATIENT
Start: 2019-12-26 | End: 2020-01-02 | Stop reason: HOSPADM

## 2019-12-26 RX ADMIN — INSULIN HUMAN 8 UNITS: 100 INJECTION, SOLUTION PARENTERAL at 23:23

## 2019-12-26 RX ADMIN — LISINOPRIL 20 MG: 20 TABLET ORAL at 08:00

## 2019-12-26 RX ADMIN — MECLIZINE HYDROCHLORIDE 25 MG: 25 TABLET ORAL at 12:16

## 2019-12-26 RX ADMIN — Medication 10 ML: at 04:19

## 2019-12-26 RX ADMIN — MECLIZINE HYDROCHLORIDE 25 MG: 25 TABLET ORAL at 23:18

## 2019-12-26 RX ADMIN — CLOPIDOGREL BISULFATE 75 MG: 75 TABLET ORAL at 08:00

## 2019-12-26 RX ADMIN — ENOXAPARIN SODIUM 40 MG: 40 INJECTION SUBCUTANEOUS at 11:18

## 2019-12-26 RX ADMIN — IOPAMIDOL 100 ML: 755 INJECTION, SOLUTION INTRAVENOUS at 04:19

## 2019-12-26 RX ADMIN — INSULIN HUMAN 2 UNITS: 100 INJECTION, SOLUTION PARENTERAL at 11:01

## 2019-12-26 RX ADMIN — ATORVASTATIN CALCIUM 40 MG: 40 TABLET, FILM COATED ORAL at 08:00

## 2019-12-26 RX ADMIN — SENNOSIDES 17.2 MG: 8.6 TABLET, FILM COATED ORAL at 21:45

## 2019-12-26 RX ADMIN — SODIUM CHLORIDE 100 ML: 900 INJECTION, SOLUTION INTRAVENOUS at 04:19

## 2019-12-26 RX ADMIN — MECLIZINE HYDROCHLORIDE 25 MG: 25 TABLET ORAL at 17:24

## 2019-12-26 RX ADMIN — PROMETHAZINE HYDROCHLORIDE 6.25 MG: 25 INJECTION INTRAMUSCULAR; INTRAVENOUS at 12:47

## 2019-12-26 RX ADMIN — ASPIRIN 325 MG ORAL TABLET 325 MG: 325 PILL ORAL at 08:00

## 2019-12-26 RX ADMIN — METOCLOPRAMIDE 10 MG: 5 INJECTION, SOLUTION INTRAMUSCULAR; INTRAVENOUS at 11:50

## 2019-12-26 RX ADMIN — SODIUM CHLORIDE 1000 ML: 900 INJECTION, SOLUTION INTRAVENOUS at 11:10

## 2019-12-26 RX ADMIN — SODIUM CHLORIDE 75 ML/HR: 900 INJECTION, SOLUTION INTRAVENOUS at 11:10

## 2019-12-26 RX ADMIN — ONDANSETRON 8 MG: 2 INJECTION INTRAMUSCULAR; INTRAVENOUS at 10:13

## 2019-12-26 RX ADMIN — INSULIN HUMAN 2 UNITS: 100 INJECTION, SOLUTION PARENTERAL at 00:18

## 2019-12-26 RX ADMIN — PERFLUTREN 1 ML: 6.52 INJECTION, SUSPENSION INTRAVENOUS at 15:33

## 2019-12-26 NOTE — PROGRESS NOTES
Progress Note    Patient: Alaina Quinteros MRN: [id number]  SSN: [id number]    YOB: 1955  Age: 59 y.o. Sex: male      Admit Date: 12/25/2019    LOS: 1 day     Subjective:   Pt aox3 this am. Ma[place]. R groin is CDI with pulses intact +2. Pt continues to have nausea and vomiting with movement. R 6th nerve palsy remains. Pt states left oral paresthesia and \"thick\" tongue. Pt is able to move RUE, but clumsy and cant control fine motor. Airway patent. MRI brain pending this am.    Current Facility-Administered Medications   Medication Dose Route Frequency    acetaminophen (TYLENOL) tablet 650 mg  650 mg Oral Q4H PRN    NUTRITIONAL SUPPORT ELECTROLYTE PRN ORDERS   Does Not Apply PRN    aspirin tablet 325 mg  325 mg Oral DAILY    clopidogrel (PLAVIX) tablet 75 mg  75 mg Oral DAILY    ondansetron (ZOFRAN) injection 4 mg  4 mg IntraVENous Q6H PRN    atorvastatin (LIPITOR) tablet 40 mg  40 mg Oral DAILY    lisinopril (PRINIVIL, ZESTRIL) tablet 20 mg  20 mg Oral DAILY    insulin regular (NOVOLIN R, HUMULIN R) injection   SubCUTAneous Q6H    HYDROcodone-acetaminophen (NORCO) 7.5-325 mg per tablet 1 Tab  1 Tab Oral Q4H PRN       Objective:     Vitals:    12/26/19 0700 12/26/19 0800 12/26/19 0901 12/26/19 1000   BP: 136/72 173/90 139/73 137/67   Pulse: 61 78 60 (!) 58   Resp: 15 23 15 16   Temp: 98.2 °F (36.8 °C) 98.4 °F (36.9 °C) 98.4 °F (36.9 °C) 98.7 °F (37.1 °C)   SpO2: 93% 96% 94% 96%   Weight:       Height:             Intake and Output:  Current Shift: No intake/output data recorded. Last 24 hr: 12/25 0701 - 12/26 0700  In: -   Out: 7066 [Urine:1850]    Physical Exam:   General:  Alert, cooperative, no distress, appears stated age. Eyes:  PERRL +3, R 6th nerve. +nystagmus   Neck: Supple, symmetrical, trachea midline, no adenopathy, thyroid: no enlargment/tenderness/nodules, no carotid bruit and no JVD. Lungs:   Clear to auscultation bilaterally.    Heart:  Regular rate and rhythm, S1, S2 normal, no murmur, click, rub or gallop. Abdomen:   Soft, non-tender. Bowel sounds normal. No masses,  No organomegaly. Extremities: Extremities normal, atraumatic, no cyanosis or edema. Pulses: 2+ and symmetric all extremities. Skin: Skin color, texture, turgor normal. No rashes or lesions. R groin CDI. Neurologic: Aox3, zarco. RUE fine motor decreased. Left facial paresthesia, speech is clear but \"thick\". Pt admits to intermittent double vision OU, but individual denies. Nausea with vomiting with movement. Lab/Data Review:    Recent Results (from the past 12 hour(s))   GLUCOSE, POC    Collection Time: 12/26/19 12:14 AM   Result Value Ref Range    Glucose (POC) 165 (H) 65 - 100 mg/dL   CBC W/O DIFF    Collection Time: 12/26/19  3:14 AM   Result Value Ref Range    WBC 8.8 4.3 - 11.1 K/uL    RBC 5.06 4.23 - 5.6 M/uL    HGB 15.2 13.6 - 17.2 g/dL    HCT 44.3 41.1 - 50.3 %    MCV 87.5 79.6 - 97.8 FL    MCH 30.0 26.1 - 32.9 PG    MCHC 34.3 31.4 - 35.0 g/dL    RDW 13.0 11.9 - 14.6 %    PLATELET 159 296 - 003 K/uL    MPV 9.8 9.4 - 12.3 FL    ABSOLUTE NRBC 0.00 0.0 - 0.2 K/uL   METABOLIC PANEL, BASIC    Collection Time: 12/26/19  3:14 AM   Result Value Ref Range    Sodium 142 136 - 145 mmol/L    Potassium 3.8 3.5 - 5.1 mmol/L    Chloride 109 (H) 98 - 107 mmol/L    CO2 27 21 - 32 mmol/L    Anion gap 6 (L) 7 - 16 mmol/L    Glucose 135 (H) 65 - 100 mg/dL    BUN 15 8 - 23 MG/DL    Creatinine 0.92 0.8 - 1.5 MG/DL    GFR est AA >60 >60 ml/min/1.73m2    GFR est non-AA >60 >60 ml/min/1.73m2    Calcium 9.1 8.3 - 10.4 MG/DL   MAGNESIUM    Collection Time: 12/26/19  3:14 AM   Result Value Ref Range    Magnesium 2.2 1.8 - 2.4 mg/dL   GLUCOSE, POC    Collection Time: 12/26/19  5:18 AM   Result Value Ref Range    Glucose (POC) 135 (H) 65 - 100 mg/dL   GLUCOSE, POC    Collection Time: 12/26/19 10:16 AM   Result Value Ref Range    Glucose (POC) 165 (H) 65 - 100 mg/dL       Assessment/ Plan:      Active Problems:    Stroke due to embolism of left vertebral artery ([facility] [address] [facility] (12/25/2019)      NEURO:pt admitted to ICU post MEEK of left vert artery and angioplasty for ischemic stroke protocol. Pt with left vert artery stenosis and thrombus. Pt had repeat imaging this am and stable. Left vert artery dissection stable, on asa/plavix-will send responses in am. MRI brain pending this am. PT/OT/ST to see pt. Airway patent. RESP:pt tolerating room air. CV:SBP goal <180. Trop neg. 2D Echo pending. SCD. Asa/plavix. LDL 57- lipitor 40mg daily. HEME: HH: 15/44,   NEPH: bun/cr: 15/0.9  GI:advance diet as tolerated, tolerating po meds. ST pending this am. nadia Angeles. ID:afebrile, no abx  LINES:IVx2, bland.      Dr. John Flores updated pt and wife at bedside this am.   Will transfer to floor unless acute changes.       Signed By: Brett Tucker NP     December 26, 2019

## 2019-12-26 NOTE — PROGRESS NOTES
Bedside and Verbal shift change report given to TATYANA Sheppard (oncoming nurse) by TATYANA Patterson (offgoing nurse). Report included the following information SBAR, Kardex, Intake/Output, MAR, Recent Results, Cardiac Rhythm NSR;SB and Alarm Parameters . Dual NIH assessment performed at bedside with TATYANA Sheppard.

## 2019-12-26 NOTE — PROGRESS NOTES
SW attempted to meet with patient and wife. Patient's wife is on the phone with family at this time. The patient is working with PT and OT at this time. RADHA-CM continuing to follow for discharge needs and PT/OT recommendations.

## 2019-12-26 NOTE — PROGRESS NOTES
Initial visit made to patient and a prayer was provided for the patient and his wife, Jamison Simon. A  card was left.         KANWAL Lopez

## 2019-12-26 NOTE — PROGRESS NOTES
Problem: Self Care Deficits Care Plan (Adult)  Goal: *Acute Goals and Plan of Care (Insert Text)  Description  1. Patient will complete total body bathing and dressing with contact guard assistance and adaptive equipment as needed. 2. Patient will complete toileting with stand by assistance and adaptive equipment as needed. 3. Patient will tolerate 20 minutes of OT treatment with up to 2 rest breaks to increase activity tolerance for ADLs. 4. Patient will complete functional transfers with stand by assistance and adaptive equipment as needed. 5. Patient will demonstrate modified independence with therapeutic HEP to increase strength in LUE to [facility] throughout for increased safety and independence with functional transfers. 6. Patient will complete functional mobility for ADLs with contact guard assistance and adaptive equipment as needed. 7. Patient will demonstrate modified independence with therapeutic HEP to increase proprioception in BUEs to [facility] throughout for increased safety and independence with functional tasks.      Timeframe: 7 visits      Outcome: Progressing Towards Goal     OCCUPATIONAL THERAPY: Initial Assessment, Daily Note, and AM 12/26/2019  INPATIENT: OT Visit Days: 1  Payor: [employer] / Plan: [place] [employer] [address] / Product Type: PPO /      NAME/AGE/GENDER: Becky Delgado is a 59 y.o. male   PRIMARY DIAGNOSIS:  Stroke due to embolism of [facility] [I63.112] <principal problem not specified> <principal problem not specified>        ICD-10: Treatment Diagnosis:    Generalized Muscle Weakness (M62.81)  Other lack of cordination (R27.8)  Difficulty in walking, Not elsewhere classified (R26.2)  Hemiplegia and hemiparesis following cerebral infarction affecting   left non-dominant side (P84.569)   Precautions/Allergies:    Fall precautions    Sulfur      ASSESSMENT:     Mr. Ra Liu is a 59 y.o. male admitted with slurred speech, R sided weakness, CVA due to L vertebral artery embolism. At baseline pt lives with wife and reports independence with ADLs, IADLs, ambulation, driving, and working full time. No hx of falls. Upon arrival pt alert and agreeable to OT evaluation and treatment. BUE assessment revealed AROM WFL in BUEs and strength WFL in RUE and most of LUE, slightly decreased in L  and L elbow extensors. Light touch sensation intact in BUEs, proprioception impaired in BUEs. Pt reports numbness in L lip. Pt completed bed mobility with SBA/cueing, demonstrates intact sitting balance. Pt practiced LB dressing with ModA to don L sock, SBA-CGA for R sock, impaired dynamic sitting balance noted and difficulty grabbing sock with R hand. Pt practiced functional transfers with MinAx2, assist to grab RW with L hand. Pt demonstrates impaired standing balance. Pt practiced taking steps to chair with ModAx2/RW/cueing for weight shifts, standing balance, RW management, and advancing LLE. Pt sat with MinAx2/cueing. Pt left seated in chair with call bell within reach. Pt presents with deficits in strength, activity tolerance, balance, ambulation, proprioception, and transfers. Val Carrillo is currently functioning below baseline and would benefit from continued OT to increase safety and independence with ADLs. Will follow. This patient is appropriate for co-treatment at this time due to multiple deficits including decreased balance, decreased proprioception, decreased endurance, decreased strength, and need for high level assistance to complete functional transfers and functional tasks.    This section established at most recent assessment   PROBLEM LIST (Impairments causing functional limitations):  Decreased Strength  Decreased ADL/Functional Activities  Decreased Transfer Abilities  Decreased Ambulation Ability/Technique  Decreased Balance  Decreased Activity Tolerance  Increased Fatigue  Decreased [place] with Home Exercise Program\  Decreased proprioception and coordination   INTERVENTIONS PLANNED: (Benefits and precautions of occupational therapy have been discussed with the patient.)  Activities of daily living training  Adaptive equipment training  Balance training  Clothing management  Community reintergration  Donning&doffing training  Group therapy  Hygiene training  Neuromuscular re-eduation  Re-evaluation  Therapeutic activity  Therapeutic exercise     TREATMENT PLAN: Frequency/Duration: Follow patient 3x/week to address above goals. Rehabilitation Potential For Stated Goals: Good     REHAB RECOMMENDATIONS (at time of discharge pending progress):    Placement: It is my opinion, based on this patient's performance to date, that Mr. Warren Mejia may benefit from intensive therapy at an [address] [place] [address] after discharge due to a probable need for 24 hour rehab nursing, a probable need for multiple therapy disciplines, and potential to make ongoing and sustainable functional improvement that is of practical value. Anticipate patient would greatly benefit from intensive inpatient rehab to address deficits impacting his previously independent level of function  Equipment:   TBD               OCCUPATIONAL PROFILE AND HISTORY:   History of Present Injury/Illness (Reason for Referral):  See H&P. Past Medical History/Comorbidities:   Mr. Warren Mejia  has a past medical history of Diabetes ([facility] [address] Hypertension, and MI (myocardial infarction) ([facility] (2002). Mr. Warren Mejia  has a past surgical history that includes hx coronary stent placement (2002) and ir perc art mech thromb infusion intracranial (12/25/2019).   Social History/Living Environment:   Home Environment: Private residence  # Steps to Enter: 3  Rails to Enter: No  One/Two Story Residence: One story  Living Alone: No  Support Systems: Spouse/Significant Other/Partner  Patient Expects to be Discharged to[de-identified] Unknown  Current DME Used/Available at Home: None  Tub or Shower Type: Shower  Prior Level of Function/Work/Activity:  At baseline pt lives with wife and reports independence with ADLs, IADLs, ambulation, driving, and working full time. No hx of falls. Dominant Side:         RIGHT    Personal Factors:          Sex:  male        Age:  59 y.o. Other factors that influence how disability is experienced by the patient:  Multiple co-morbidities    Number of Personal Factors/Comorbidities that affect the Plan of Care: Expanded review of therapy/medical records (1-2):  MODERATE COMPLEXITY   ASSESSMENT OF OCCUPATIONAL PERFORMANCE[de-identified]   Activities of Daily Living:   Basic ADLs (From Assessment) Complex ADLs (From Assessment)   Feeding: Minimum assistance  Oral Facial Hygiene/Grooming: Moderate assistance  Bathing: Moderate assistance  Upper Body Dressing: Minimum assistance  Lower Body Dressing: Moderate assistance  Toileting: Moderate assistance Instrumental ADL  Meal Preparation: Total assistance  Homemaking:  Total assistance   Grooming/Bathing/Dressing Activities of Daily Living     Cognitive Retraining  Safety/Judgement: Fall prevention;Decreased awareness of environment;Decreased awareness of need for safety                       Bed/Mat Mobility  Supine to Sit: Stand-by assistance  Sit to Stand: Minimum assistance;Assist x2  Stand to Sit: Minimum assistance;Assist x2  Bed to Chair: Moderate assistance;Assist x2  Scooting: Contact guard assistance     Most Recent Physical Functioning:   Gross Assessment:  AROM: Within functional limits(BUEs)  PROM: Within functional limits(BUEs)  Strength: Generally decreased, functional(LUE)  Coordination: Grossly decreased, non-functional(BUEs)  Tone: Normal(BUEs)  Sensation: Intact(BUEs to light touch)               Posture:     Balance:  Sitting: Impaired  Sitting - Static: Good (unsupported)  Sitting - Dynamic: Fair (occasional)  Standing: Impaired  Standing - Static: Poor  Standing - Dynamic : Poor Bed Mobility:  Supine to Sit: Stand-by assistance  Scooting: Contact guard assistance  Wheelchair Mobility:     Transfers:  Sit to Stand: Minimum assistance;Assist x2  Stand to Sit: Minimum assistance;Assist x2  Bed to Chair: Moderate assistance;Assist x2            Patient Vitals for the past 6 hrs:   BP BP Patient Position SpO2 Pulse   19 1000 137/67 -- 96 % (!) 58   19 1004 145/72 Sitting -- --   19 1005 137/67 Supine -- --   19 1006 (!) 153/112 Sitting -- --   19 1033 145/72 -- -- --   19 1100 145/72 -- 97 % 62   19 1201 (!) 166/93 -- 97 % 94   19 1300 114/65 -- 98 % 60   19 1400 114/65 -- 96 % 63   19 1500 139/73 -- 97 % 60       Mental Status  Neurologic State: Alert, Eyes open to voice  Orientation Level: Appropriate for age  Cognition: Follows commands, Decreased attention/concentration  Perception: Visual  Perseveration: No perseveration noted  Safety/Judgement: Fall prevention, Decreased awareness of environment, Decreased awareness of need for safety                          Physical Skills Involved:  Balance  Strength  Activity Tolerance  Fine Motor Control  Gross Motor Control Cognitive Skills Affected (resulting in the inability to perform in a timely and safe manner):  None  Psychosocial Skills Affected:  Habits/Routines  Environmental Adaptation  Social Interaction  Emotional Regulation  Self-Awareness  Awareness of Others  Social Roles   Number of elements that affect the Plan of Care: 5+:  HIGH COMPLEXITY   CLINICAL DECISION MAKIN hospitals [address] [address] AM-PAC 6 Clicks   Daily Activity Inpatient Short Form  How much help from another person does the patient currently need. .. Total A Lot A Little None   1. Putting on and taking off regular lower body clothing? [] 1   [x] 2   [] 3   [] 4   2. Bathing (including washing, rinsing, drying)? [] 1   [x] 2   [] 3   [] 4   3. Toileting, which includes using toilet, bedpan or urinal?   [] 1   [x] 2   [] 3   [] 4   4.   Putting on and taking off regular upper body clothing? [] 1   [] 2   [x] 3   [] 4   5. Taking care of personal grooming such as brushing teeth? [] 1   [x] 2   [] 3   [] 4   6. Eating meals? [] 1   [] 2   [x] 3   [] 4   © 2007, Trustees of [address] [place] [address] [address] [id number], under license to [employer]. All rights reserved      Score:  Initial: 14 12/26/19 Most Recent: X (Date: -- )    Interpretation of Tool:  Represents activities that are increasingly more difficult (i.e. Bed mobility, Transfers, Gait). Medical Necessity:     Patient demonstrates   good   rehab potential due to higher previous functional level. Reason for Services/Other Comments:  Patient continues to require skilled intervention due to   Inability to complete ADLs at prior level of independence   . Use of outcome tool(s) and clinical judgement create a POC that gives a: MODERATE COMPLEXITY         TREATMENT:   (In addition to Assessment/Re-Assessment sessions the following treatments were rendered)     Pre-treatment Symptoms/Complaints:    Pain: Initial:   Pain Intensity 1: 0  Post Session:  same     Today's treatment session addressed Decreased Strength, Decreased ADL/Functional Activities, Decreased Transfer Abilities, Decreased Ambulation Ability/Technique, Decreased Balance, and Increased Fatigue to progress towards achieving goal(s) 1, 3, 4, and 6. During this session,  Physical Therapy addressed  Ambulation to progress towards their discipline specific goal(s). Co-treatment was necessary to improve patient's ability to follow higher level commands, ability to increase activity demands, and ability to return to normal functional activity. Neuromuscular Re-education: ( 25 minutes ):  Exercise/activities per grid below to improve balance, coordination, posture, and proprioception. Required moderate visual, verbal, manual, and tactile cues to promote static and dynamic balance in standing and promote coordination of bilateral, upper extremity(s), lower extremity(s).  Pt practiced LB dressing with ModA to don L sock, SBA-CGA for R sock, impaired dynamic sitting balance noted and difficulty grabbing sock with R hand. Pt practiced functional transfers with MinAx2, assist to grab RW with L hand. Pt demonstrates impaired standing balance. Pt practiced taking steps to chair with ModAx2/RW/cueing for weight shifts, standing balance, RW management, and advancing LLE. Pt sat with MinAx2/cueing. Braces/Orthotics/Lines/Etc:   IV  bland catheter  ICU monitoring   O2 Device: Room air  Treatment/Session Assessment:    Response to Treatment:  Tolerated well   Interdisciplinary Collaboration:   Physical Therapist  Occupational Therapist  Registered Nurse  After treatment position/precautions:   Up in chair  Bed alarm/tab alert on  Bed/Chair-wheels locked  Bed in low position  Call light within reach  RN notified  Family at bedside   Compliance with Program/Exercises: Compliant all of the time, Will assess as treatment progresses. Recommendations/Intent for next treatment session: \"Next visit will focus on advancements to more challenging activities and reduction in assistance provided\".   Total Treatment Duration:  OT Patient Time In/Time Out  Time In: 1004  Time Out: Jennifer 7541, OTR/L

## 2019-12-26 NOTE — ADT AUTH CERT NOTES
Jose A Echavarria Physician Specialty Neurosurgery Jose A Echavarria Physician Provider Summary Title Resident? Provider type MD No Physician Primary Contact Information Phone Fax E-mail Address [phone] Not available [address] [facility] [address] Specialties Neurosurgery     
      
Additional Contact Information Phone Fax E-mail Address [phone] Not available [address]  [address] [facility] [address] Department Name [facility] NEUROEND[facility]ASC[facility] [id number] Nurse Navigator Assigned to Department Nurse Navigator Phone  
   
  
   
NPI AND UPIN  
 
NPI NPI  
NATIONAL PROVIDER ID [6] [id number] PROVIDER LINK [900] [id number] [place] PROVIDER ID [[id number]] [id number] [facility] PROVIDER ID [[id number] [place] [54] [id number] [facility] [97] [id number] [facility] [27] [id number] [address] [facility] [[id number]] [phone]

## 2019-12-26 NOTE — PROGRESS NOTES
MRI scanner DT and ES are both currently down unsure of when the scanner will be up at this time.  Currently ordering parts

## 2019-12-26 NOTE — PROGRESS NOTES
Patient is still having mild dysarthria upon assessment. Still states that the left side of his tongue and lip are slightly numb. Patient states that he has no numbness or tingling from any extremities. Patient is alert and oriented x3. Eyes open spontaneously, follows commands appropriately. PERRLA @ 3mm. Patient is able to move all extremities. No drift from BUE or BLE.  are equal on both sides. Has intermittent spastic movements with BUE. R groin site is c/d/i, no bleeding/no hematoma present.

## 2019-12-26 NOTE — PROGRESS NOTES
SPEECH PATHOLOGY NOTE:    Speech therapy consult received and appreciated. Attempted to see this AM for dysphagia evaluation. Patient began vomiting as clinician arrived- before trials could be presented. RN notified. Will follow up for full evaluation at later time/date. Patient and wife in agreement with plan.      Kristin Shaffer, [facility], [facility], CCC-SLP

## 2019-12-26 NOTE — PROGRESS NOTES
Bedside and Verbal shift change report given to TATYANA Patterson (oncoming nurse) by TATYANA Sheppadr (offgoing nurse). Report included the following information SBAR, Kardex, Procedure Summary, Intake/Output, MAR and Cardiac Rhythm NSR. Dual NIH assessment completed at bedside with TATYANA Sheppard and napoleon Bloom RN. All VSS at this time. R groin site assessed and is c/d/i. No signs of bleeding/hematoma are present.

## 2019-12-26 NOTE — PROGRESS NOTES
Problem: Mobility Impaired (Adult and Pediatric)  Goal: *Acute Goals and Plan of Care (Insert Text)  Description  LTG:  (1.)Mr. Jamie Calhoun will move from supine to sit and sit to supine , scoot up and down and roll side to side in bed with MODIFIED INDEPENDENCE within 7 treatment day(s). (2.)Mr. Jamie Calhoun will transfer from bed to chair and chair to bed with STAND BY ASSIST using the least restrictive device within 7 treatment day(s). (3.)Mr. Jamie Calhoun will ambulate with CONTACT GUARD ASSIST for 80 feet with the least restrictive device within 7 treatment day(s). (4.)Mr. Jamie Calhoun will maintain at least GOOD static/dynamic sitting balance for improved safety within 7 treatment days. (5.)Mr. Jamie Calhoun will perform standing static and dynamic balance activities x 15 minutes with CONTACT GUARD ASSIST to improve safety within 7 treatment day(s). ________________________________________________________________________________________________     Outcome: Progressing Towards Goal     PHYSICAL THERAPY: Initial Assessment and AM 12/26/2019  INPATIENT: PT Visit Days : 1  Payor: Jg Mcgregor / Plan: [facility] / Product Type: PPO /       NAME/AGE/GENDER: Danny Mae is a 59 y.o. male   PRIMARY DIAGNOSIS: Stroke due to embolism of left vertebral artery ([facility] [address] [I63.112] <principal problem not specified> <principal problem not specified>        ICD-10: Treatment Diagnosis:    · Generalized Muscle Weakness (M62.81)  · Difficulty in walking, Not elsewhere classified (R26.2)   Precaution/Allergies:  Sulfur      ASSESSMENT:     Mr. Jamie Calhoun is a 59 y.o. male in the hospital for CVA workup with finding of L vertebral artery occulusion. Pt underwent mechanical endarterectomy 12/25/19 by Dr. Tacos Olivarez. Co-evaluation and treatment performed given pt's higher complexity and decreased activity tolerance. Pt reports that he lives in a one story house with wife that has 3 steps to enter.   Mr. Jamie Calhoun presents to PT with WFL AROM and decreased B hip flexor strength. Pt also presents with intact sensation and normal tone in B LEs. Pt appears to have some decreased coordination in B LEs. Pt came to sitting on EOB with additional time and SBA. Pt with good to fair sitting balance but poor standing balance. Pt required Madie x 2 for STS with poor standing balance. Pt then ambulated short distances in room with modA x 2/RW/gait belt. Pt noted to demonstrate somewhat ataxic gait with cues needed for RW management and safety. Pt kept wanting to keep his eyes closed throughout transfer and required frequent cuing for safety awareness. Pt's BP recorded several times throughout given pt appeared diaphoretic (153/112 & 145/72). Pt's wife at bedside and reported pt normally sweats a good deal.  Mr. Zhang Cristina could benefit from skilled PT as he is currently functioning below his baseline. This section established at most recent assessment   PROBLEM LIST (Impairments causing functional limitations):  1. Decreased Strength  2. Decreased Transfer Abilities  3. Decreased Ambulation Ability/Technique  4. Decreased Balance  5. Decreased Activity Tolerance  6. Increased Fatigue   INTERVENTIONS PLANNED: (Benefits and precautions of physical therapy have been discussed with the patient.)  1. Balance Exercise  2. Bed Mobility  3. Family Education  4. Gait Training  5. Home Exercise Program (HEP)  6. Neuromuscular Re-education/Strengthening  7. Therapeutic Activites  8. Therapeutic Exercise/Strengthening  9. Transfer Training     TREATMENT PLAN: Frequency/Duration: 3 times a week for duration of hospital stay  Rehabilitation Potential For Stated Goals: Good     REHAB RECOMMENDATIONS (at time of discharge pending progress):    Placement: It is my opinion, based on this patient's performance to date, that Mr. Zhang Cristina may benefit from intensive therapy at an [address] [place] [address] after discharge due to potential to make ongoing and sustainable functional improvement that is of practical value. .  Equipment:    None at this time              HISTORY:   History of Present Injury/Illness (Reason for Referral):  CVA  Past Medical History/Comorbidities:   Mr. Abbey Palafox  has a past medical history of Diabetes ([facility] [address] Hypertension, and MI (myocardial infarction) ([facility] [address] (2002). Mr. Abbey Palafox  has a past surgical history that includes hx coronary stent placement (2002) and ir perc art mech thromb infusion intracranial (12/25/2019). Social History/Living Environment:   Home Environment: Private residence  # Steps to Enter: 3  Rails to Enter: No  One/Two Story Residence: One story  Living Alone: No  Support Systems: Spouse/Significant Other/Partner  Patient Expects to be Discharged to[de-identified] Unknown  Current DME Used/Available at Home: None  Tub or Shower Type: Shower  Prior Level of Function/Work/Activity:  Lives with wife and independent PTA. Works full time. Number of Personal Factors/Comorbidities that affect the Plan of Care: 1-2: MODERATE COMPLEXITY   EXAMINATION:   Most Recent Physical Functioning:   Gross Assessment:  AROM: Within functional limits  PROM: Within functional limits  Strength: Generally decreased, functional(B hip flexors 4/5)  Coordination: Generally decreased, functional  Tone: Normal  Sensation: Intact               Posture:     Balance:  Sitting: Impaired  Sitting - Static: Good (unsupported)  Sitting - Dynamic: Fair (occasional)  Standing: Impaired  Standing - Static: Poor  Standing - Dynamic : Poor Bed Mobility:  Supine to Sit: Stand-by assistance  Scooting: Contact guard assistance  Wheelchair Mobility:     Transfers:  Sit to Stand: Minimum assistance;Assist x2  Stand to Sit: Minimum assistance;Assist x2  Bed to Chair: Moderate assistance;Assist x2  Gait:     Step Length: Right shortened;Left shortened  Gait Abnormalities: Ataxic;Decreased step clearance; Path deviations;Trunk sway increased  Distance (ft): 3 Feet (ft)  Assistive Device: Walker, rolling;Gait belt  Ambulation - Level of Assistance: Moderate assistance;Assist x2      Body Structures Involved:  1. Nerves  2. Muscles Body Functions Affected:  1. Sensory/Pain  2. Neuromusculoskeletal  3. Movement Related Activities and Participation Affected:  1. General Tasks and Demands  2. Communication  3. Mobility  4. Self Care  5. Domestic Life  6. Community, Social and [place]   Number of elements that affect the Plan of Care: 4+: HIGH COMPLEXITY   CLINICAL PRESENTATION:   Presentation: Evolving clinical presentation with changing clinical characteristics: MODERATE COMPLEXITY   CLINICAL DECISION MAKIN [facility] Mobility Inpatient Short Form  How much difficulty does the patient currently have. .. Unable A Lot A Little None   1. Turning over in bed (including adjusting bedclothes, sheets and blankets)? [] 1   [] 2   [] 3   [x] 4   2. Sitting down on and standing up from a chair with arms ( e.g., wheelchair, bedside commode, etc.)   [] 1   [] 2   [x] 3   [] 4   3. Moving from lying on back to sitting on the side of the bed? [] 1   [] 2   [x] 3   [] 4   How much help from another person does the patient currently need. .. Total A Lot A Little None   4. Moving to and from a bed to a chair (including a wheelchair)? [] 1   [x] 2   [] 3   [] 4   5. Need to walk in hospital room? [] 1   [x] 2   [] 3   [] 4   6. Climbing 3-5 steps with a railing? [] 1   [x] 2   [] 3   [] 4   © , Trustees of [address] [place], [place] [id number], under license to [employer]. All rights reserved      Score:  Initial: 16 Most Recent: X (Date: -- )    Interpretation of Tool:  Represents activities that are increasingly more difficult (i.e. Bed mobility, Transfers, Gait). Medical Necessity:     · Patient demonstrates   · good  ·  rehab potential due to higher previous functional level.   Reason for Services/Other Comments:  · Patient continues to require skilled intervention due to   · Decreased functional mobility and balance   · . Use of outcome tool(s) and clinical judgement create a POC that gives a: Questionable prediction of patient's progress: MODERATE COMPLEXITY            TREATMENT:   (In addition to Assessment/Re-Assessment sessions the following treatments were rendered)   Pre-treatment Symptoms/Complaints:  \"Swimmy headed\"  Pain: Initial:   Pain Intensity 1: 0  Post Session:  0       Today's treatment session addressed Decreased Strength, Decreased ADL/Functional Activities, Decreased Transfer Abilities, Decreased Ambulation Ability/Technique, Decreased Balance, Decreased Activity Tolerance and Increased Fatigue to progress towards achieving goal(s) . During this session, Occupational Therapy addressed neuromuscular re-education to progress towards their discipline specific goal(s). Co-treatment was necessary to improve patient's ability to increase activity demands and ability to return to normal functional activity. Gait Training (  8 minutes):  Gait training to improve B LE coordination and proprioception. Ambulated 3 Feet (ft) with Moderate assistance;Assist x2 using a Walker, rolling;Gait belt with moderate cuing for safety     Therapeutic Activity: (    15 minutes): Therapeutic activities including Bed transfers and sitting activities to improve mobility, strength, balance, coordination and activity tolerance. Required minimal cuing   to promote dynamic balance in sitting and promote coordination of bilateral, upper extremity(s), lower extremity(s). Braces/Orthotics/Lines/Etc:   · O2 Device: Room air  Treatment/Session Assessment:    · Response to Treatment:  Fairly given some nausea and decreased safety awareness.   · Interdisciplinary Collaboration:   o Physical Therapist  o Occupational Therapist  o Registered Nurse  · After treatment position/precautions:   o Up in chair  o Bed alarm/tab alert on  o Bed/Chair-wheels locked  o Bed in low position  o Call light within reach  o RN notified  o Family at bedside   · Compliance with Program/Exercises: Will assess as treatment progresses  · Recommendations/Intent for next treatment session: \"Next visit will focus on advancements to more challenging activities and reduction in assistance provided\".   Total Treatment Duration:  PT Patient Time In/Time Out  Time In: 1004  Time Out: Oseas [address] Toribio PT, DPT

## 2019-12-27 LAB
ANION GAP SERPL CALC-SCNC: 6 MMOL/L (ref 7–16)
ASPIRIN TEST, ASPIRN: 566 ARU (ref 620–672)
BUN SERPL-MCNC: 18 MG/DL (ref 8–23)
CALCIUM SERPL-MCNC: 8.5 MG/DL (ref 8.3–10.4)
CHLORIDE SERPL-SCNC: 112 MMOL/L (ref 98–107)
CO2 SERPL-SCNC: 25 MMOL/L (ref 21–32)
CREAT SERPL-MCNC: 0.91 MG/DL (ref 0.8–1.5)
ERYTHROCYTE [DISTWIDTH] IN BLOOD BY AUTOMATED COUNT: 13.2 % (ref 11.9–14.6)
GLUCOSE BLD STRIP.AUTO-MCNC: 158 MG/DL (ref 65–100)
GLUCOSE BLD STRIP.AUTO-MCNC: 226 MG/DL (ref 65–100)
GLUCOSE BLD STRIP.AUTO-MCNC: 271 MG/DL (ref 65–100)
GLUCOSE BLD STRIP.AUTO-MCNC: 274 MG/DL (ref 65–100)
GLUCOSE SERPL-MCNC: 203 MG/DL (ref 65–100)
HCT VFR BLD AUTO: 42.6 % (ref 41.1–50.3)
HGB BLD-MCNC: 14.2 G/DL (ref 13.6–17.2)
MAGNESIUM SERPL-MCNC: 2.3 MG/DL (ref 1.8–2.4)
MCH RBC QN AUTO: 29.6 PG (ref 26.1–32.9)
MCHC RBC AUTO-ENTMCNC: 33.3 G/DL (ref 31.4–35)
MCV RBC AUTO: 88.9 FL (ref 79.6–97.8)
NRBC # BLD: 0 K/UL (ref 0–0.2)
P2Y12 PLT RESPONSE,PPPR: 197 PRU
PLATELET # BLD AUTO: 173 K/UL (ref 150–450)
PMV BLD AUTO: 9.9 FL (ref 9.4–12.3)
POTASSIUM SERPL-SCNC: 3.9 MMOL/L (ref 3.5–5.1)
RBC # BLD AUTO: 4.79 M/UL (ref 4.23–5.6)
SODIUM SERPL-SCNC: 143 MMOL/L (ref 136–145)
WBC # BLD AUTO: 7.3 K/UL (ref 4.3–11.1)

## 2019-12-27 PROCEDURE — 61645 PERQ ART M-THROMBECT &/NFS: CPT | Performed by: NEUROLOGICAL SURGERY

## 2019-12-27 PROCEDURE — 36415 COLL VENOUS BLD VENIPUNCTURE: CPT

## 2019-12-27 PROCEDURE — 36226 PLACE CATH VERTEBRAL ART: CPT | Performed by: NEUROLOGICAL SURGERY

## 2019-12-27 PROCEDURE — 99231 SBSQ HOSP IP/OBS SF/LOW 25: CPT | Performed by: NURSE PRACTITIONER

## 2019-12-27 PROCEDURE — 74011636637 HC RX REV CODE- 636/637: Performed by: NURSE PRACTITIONER

## 2019-12-27 PROCEDURE — 80048 BASIC METABOLIC PNL TOTAL CA: CPT

## 2019-12-27 PROCEDURE — 74011250636 HC RX REV CODE- 250/636: Performed by: NURSE PRACTITIONER

## 2019-12-27 PROCEDURE — 65270000029 HC RM PRIVATE

## 2019-12-27 PROCEDURE — 85027 COMPLETE CBC AUTOMATED: CPT

## 2019-12-27 PROCEDURE — 92610 EVALUATE SWALLOWING FUNCTION: CPT

## 2019-12-27 PROCEDURE — 85576 BLOOD PLATELET AGGREGATION: CPT

## 2019-12-27 PROCEDURE — 83735 ASSAY OF MAGNESIUM: CPT

## 2019-12-27 PROCEDURE — 74011250637 HC RX REV CODE- 250/637: Performed by: NURSE PRACTITIONER

## 2019-12-27 PROCEDURE — 99252 IP/OBS CONSLTJ NEW/EST SF 35: CPT | Performed by: PHYSICAL MEDICINE & REHABILITATION

## 2019-12-27 PROCEDURE — 77030020263 HC SOL INJ SOD CL0.9% LFCR 1000ML

## 2019-12-27 PROCEDURE — 82962 GLUCOSE BLOOD TEST: CPT

## 2019-12-27 RX ADMIN — ASPIRIN 325 MG ORAL TABLET 325 MG: 325 PILL ORAL at 08:36

## 2019-12-27 RX ADMIN — CLOPIDOGREL BISULFATE 75 MG: 75 TABLET ORAL at 08:36

## 2019-12-27 RX ADMIN — MECLIZINE HYDROCHLORIDE 25 MG: 25 TABLET ORAL at 16:45

## 2019-12-27 RX ADMIN — SODIUM CHLORIDE 75 ML/HR: 900 INJECTION, SOLUTION INTRAVENOUS at 06:13

## 2019-12-27 RX ADMIN — INSULIN HUMAN 6 UNITS: 100 INJECTION, SOLUTION PARENTERAL at 16:44

## 2019-12-27 RX ADMIN — INSULIN HUMAN 2 UNITS: 100 INJECTION, SOLUTION PARENTERAL at 06:17

## 2019-12-27 RX ADMIN — INSULIN HUMAN 6 UNITS: 100 INJECTION, SOLUTION PARENTERAL at 22:37

## 2019-12-27 RX ADMIN — SENNOSIDES 17.2 MG: 8.6 TABLET, FILM COATED ORAL at 21:35

## 2019-12-27 RX ADMIN — LISINOPRIL 20 MG: 20 TABLET ORAL at 08:36

## 2019-12-27 RX ADMIN — ATORVASTATIN CALCIUM 40 MG: 40 TABLET, FILM COATED ORAL at 08:36

## 2019-12-27 RX ADMIN — MECLIZINE HYDROCHLORIDE 25 MG: 25 TABLET ORAL at 10:41

## 2019-12-27 RX ADMIN — ENOXAPARIN SODIUM 40 MG: 40 INJECTION SUBCUTANEOUS at 10:40

## 2019-12-27 RX ADMIN — MECLIZINE HYDROCHLORIDE 25 MG: 25 TABLET ORAL at 06:10

## 2019-12-27 RX ADMIN — INSULIN HUMAN 4 UNITS: 100 INJECTION, SOLUTION PARENTERAL at 11:52

## 2019-12-27 NOTE — CONSULTS
PM&R Rehab Consult    Subjective:     Date of Consultation:  December 27, 2019    Referring Physician: Dr Gloria Crawford ( for Dr Eliud Herrera ) ; endovascular neurosurgery    Patient is a 59 y.o. 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 He reports that he was able to walk out to the car and into the ED but then his legs felt 'like wet noodles. \"  NIHSS 1 for dysarthria. Taken directly to CT for head CT which showed chronic microvascular changes but no acute findings. When he returned to the ED for CT he was noted to be very ataxic when the ED physician attempted to ambulate pt. Endovascular NS NP , Francine Wade, recommended a CTA/CTP. This showed a partially occlusive thrombus in the tip of the basilar artery. The thrombus extends into the origin of the right superior cerebellar artery, occluding the vessel. Pt was then taken to endovascular suite and underwent a left vertebral artery angiogram with MEEK of the left VA and angioplasty. This was performed by Dr Gloria Crawford with the assistance of MARGIE Wade  Post procedure he noted ongoing ataxia, diplopia , vertigo and dysarthria. On 12/26 he had significant associated n/v. F/u Head CT stable. CTA/CTP showed improved flow at the origin of the left vertebral artery and the previously ischemic area of the right cerebellum had improved as well. MRI revealed a small foci of acute infarction within the posterior fossa within the right aspect of the walter and bilateral cerebellar hemispheres  including the cerebellar vermis.   On functional evaluation by PT; STS min assist of 2, bed to chair transfers mod assist of 2, ambulated 3 ft with RW with mod assist of two with significant ataxia. He requires min/mod assist w/ ADLs. Pts HgA1c is elevated at 8.3 and bs have been elevated here. LDL 57.4, HDL 32. bp 158/82. He is on Plavix, ASA and Lipitor. Meclizine has been given for vertigo. He feels that his diplopia has improved. Speech \"still thick\" but improving. Past Medical History:   Diagnosis Date    Diabetes ([facility]     Hypertension     MI (myocardial infarction) ([facility] [address] 2002    100% blockage in right coronary artery. Family History   Problem Relation Age of Onset    Heart Disease Father     Heart Attack Father       Social History     Tobacco Use    Smoking status: Former Smoker    Smokeless tobacco: Former User   Substance Use Topics    Alcohol use: No   Home Environment: Private residence  # Steps to Enter: 3  Rails to [employer]: No  One/Two Story Residence: One story  Living Alone: No  Support Systems: Spouse/Significant Other/Partner  Patient Expects to be Discharged to[de-identified] Unknown  Current DME Used/Available at Home: None  Tub or Shower Type: Shower  Prior Level of Function/Work/Activity:  At baseline pt lives with wife and reports independence with ADLs, IADLs, ambulation, driving, and working full time. No hx of falls. Dominant Side:         RIGHT  Past Surgical History:   Procedure Laterality Date    HX CORONARY STENT PLACEMENT  2002    right coronary stent    IR PERC [facility] THROMB INFUSION INTRACRANIAL  12/25/2019      Prior to Admission medications    Medication Sig Start Date End Date Taking? Authorizing Provider   aspirin delayed-release 81 mg tablet Take  by mouth daily. Yes Provider, Historical   atorvastatin (LIPITOR) 40 mg tablet Take 1 Tab by mouth daily. 11/21/19  Yes Armen Echavarria MD   empagliflozin (JARDIANCE) 10 mg tablet Take 1 Tab by mouth daily.  11/21/19  Yes Armen Echavarria MD   metoprolol succinate (TOPROL-XL) 100 mg tablet TAKE 1 TABLET DAILY 5/9/19  Yes Edwin Mccarthy MD   metFORMIN (GLUCOPHAGE) 500 mg tablet Take 1 Tab by mouth two (2) times daily (with meals). 18  Yes Deyvi Mcdonald MD   lisinopril (PRINIVIL, ZESTRIL) 20 mg tablet Take 1 Tab by mouth daily. 18  Yes Deyvi Mcdonald MD   nitroglycerin (NITROSTAT) 0.4 mg SL tablet 1 Tab by SubLINGual route every five (5) minutes as needed for Chest Pain. 18  Yes Francis Ray MD   Blood-Glucose Meter monitoring kit TEST DAILY 18  Yes Deyvi Mcdonald MD   glucose blood VI test strips (ASCENSIA AUTODISC VI, ONE TOUCH ULTRA TEST VI) strip TEST DAILY 18  Yes Deyvi Mcdonald MD   Lancets misc TEST DAILY 18  Yes Deyvi Mcdonald MD     Allergies   Allergen Reactions    Sulfur Unknown (comments)        Review of Systems:  A comprehensive review of systems was negative except for that written in the HPI. Objective:     Vitals:  Blood pressure 158/82, pulse 76, temperature 97.9 °F (36.6 °C), resp. rate 19, height 6' 1\" (1.854 m), weight 222 lb 10.6 oz (101 kg), SpO2 95 %. Temp (24hrs), Av.9 °F (36.6 °C), Min:97.5 °F (36.4 °C), Max:98.1 °F (36.7 °C)      Intake and Output:   1901 -  0700  In: 2179 [I.V.:2179]  Out: 4700 [Urine:4700]    Physical Exam:  General:  Alert, oriented and mood affect appropriate   Lungs:   Clear to auscultation bilaterally. Heart:  Regular rate and rhythm, S1, S2 stable, no murmur, click, rub or gallop. Abdomen:   Soft, non-tender. Bowel sounds present. No masses,  No organomegaly. Genitourinary: defer   Neuro Muscular: Strength preserved although prox L shoulder flexion 4/5 vs 5/5. Mild RUE drift with dec fine motor coordination. PERRLA, EOMI, no noted nystagmus on my exam. Mild cranial nerve 6 palsy on the right. Impaired proprioception in distal extremities. No dysmetria, mild RUE drift. fair dynamic sitting balance     Skin:  No rashes, lesions, or signs/symptoms or infection.  No edema       Labs/Studies:  Recent Results (from the past 72 hour(s))   CBC WITH AUTOMATED DIFF    Collection Time: 12/25/19  3:13 PM   Result Value Ref Range    WBC 7.0 4.3 - 11.1 K/uL    RBC 6.06 (H) 4.23 - 5.6 M/uL    HGB 18.6 (H) 13.6 - 17.2 g/dL    HCT 52.6 (H) 41.1 - 50.3 %    MCV 86.8 79.6 - 97.8 FL    MCH 30.7 26.1 - 32.9 PG    MCHC 35.4 (H) 31.4 - 35.0 g/dL    RDW 12.9 11.9 - 14.6 %    PLATELET 746 596 - 745 K/uL    MPV 9.4 9.4 - 12.3 FL    ABSOLUTE NRBC 0.00 0.0 - 0.2 K/uL    DF AUTOMATED      NEUTROPHILS 55 43 - 78 %    LYMPHOCYTES 34 13 - 44 %    MONOCYTES 7 4.0 - 12.0 %    EOSINOPHILS 2 0.5 - 7.8 %    BASOPHILS 1 0.0 - 2.0 %    IMMATURE GRANULOCYTES 1 0.0 - 5.0 %    ABS. NEUTROPHILS 3.9 1.7 - 8.2 K/UL    ABS. LYMPHOCYTES 2.4 0.5 - 4.6 K/UL    ABS. MONOCYTES 0.5 0.1 - 1.3 K/UL    ABS. EOSINOPHILS 0.2 0.0 - 0.8 K/UL    ABS. BASOPHILS 0.1 0.0 - 0.2 K/UL    ABS. IMM. GRANS. 0.1 0.0 - 0.5 K/UL   METABOLIC PANEL, COMPREHENSIVE    Collection Time: 12/25/19  3:13 PM   Result Value Ref Range    Sodium 141 136 - 145 mmol/L    Potassium 4.0 3.5 - 5.1 mmol/L    Chloride 104 98 - 107 mmol/L    CO2 29 21 - 32 mmol/L    Anion gap 8 7 - 16 mmol/L    Glucose 236 (H) 65 - 100 mg/dL    BUN 20 8 - 23 MG/DL    Creatinine 1.28 0.8 - 1.5 MG/DL    GFR est AA >60 >60 ml/min/1.73m2    GFR est non-AA >60 >60 ml/min/1.73m2    Calcium 9.9 8.3 - 10.4 MG/DL    Bilirubin, total 0.6 0.2 - 1.1 MG/DL    ALT (SGPT) 31 12 - 65 U/L    AST (SGOT) 14 (L) 15 - 37 U/L    Alk.  phosphatase 87 50 - 136 U/L    Protein, total 7.5 6.3 - 8.2 g/dL    Albumin 4.1 3.2 - 4.6 g/dL    Globulin 3.4 2.3 - 3.5 g/dL    A-G Ratio 1.2 1.2 - 3.5     POC PT/INR    Collection Time: 12/25/19  3:13 PM   Result Value Ref Range    Prothrombin time (POC) 12.4 (H) 9.6 - 11.6 SECS    INR (POC) 1.0 0.9 - 1.2     GLUCOSE, POC    Collection Time: 12/25/19  3:13 PM   Result Value Ref Range    Glucose (POC) 221 (H) 65 - 100 mg/dL   GLUCOSE, POC    Collection Time: 12/25/19  7:21 PM   Result Value Ref Range    Glucose (POC) 217 (H) 65 - 944 mg/dL   METABOLIC PANEL, BASIC    Collection Time: 12/25/19  7:22 PM   Result Value Ref Range    Sodium 142 136 - 145 mmol/L    Potassium 4.3 3.5 - 5.1 mmol/L    Chloride 108 (H) 98 - 107 mmol/L    CO2 27 21 - 32 mmol/L    Anion gap 7 7 - 16 mmol/L    Glucose 224 (H) 65 - 100 mg/dL    BUN 18 8 - 23 MG/DL    Creatinine 0.96 0.8 - 1.5 MG/DL    GFR est AA >60 >60 ml/min/1.73m2    GFR est non-AA >60 >60 ml/min/1.73m2    Calcium 8.8 8.3 - 10.4 MG/DL   MAGNESIUM    Collection Time: 12/25/19  7:22 PM   Result Value Ref Range    Magnesium 1.9 1.8 - 2.4 mg/dL   HEPATIC FUNCTION PANEL    Collection Time: 12/25/19  7:22 PM   Result Value Ref Range    Protein, total 6.2 (L) 6.3 - 8.2 g/dL    Albumin 3.4 3.2 - 4.6 g/dL    Globulin 2.8 2.3 - 3.5 g/dL    A-G Ratio 1.2 1.2 - 3.5      Bilirubin, total 0.7 0.2 - 1.1 MG/DL    Bilirubin, direct 0.2 <0.4 MG/DL    Alk.  phosphatase 67 50 - 136 U/L    AST (SGOT) 11 (L) 15 - 37 U/L    ALT (SGPT) 22 12 - 65 U/L   CK    Collection Time: 12/25/19  7:22 PM   Result Value Ref Range    CK 53 21 - 215 U/L   CK WITH MB    Collection Time: 12/25/19  7:22 PM   Result Value Ref Range    CK 51 21 - 215 U/L    CK - MB 2.5 0.5 - 3.6 ng/ml    CK-MB Index 4.9 (H) <2.5 %   TROPONIN I    Collection Time: 12/25/19  7:22 PM   Result Value Ref Range    Troponin-I, Qt. <0.02 (L) 0.02 - 0.05 NG/ML   LIPID PANEL    Collection Time: 12/25/19  7:22 PM   Result Value Ref Range    LIPID PROFILE          Cholesterol, total 108 <200 MG/DL    Triglyceride 93 35 - 150 MG/DL    HDL Cholesterol 32 (L) 40 - 60 MG/DL    LDL, calculated 57.4 <100 MG/DL    VLDL, calculated 18.6 6.0 - 23.0 MG/DL    CHOL/HDL Ratio 3.4     HEMOGLOBIN A1C WITH EAG    Collection Time: 12/25/19  7:22 PM   Result Value Ref Range    Hemoglobin A1c 8.3 (H) 4.8 - 6.0 %    Est. average glucose 192 mg/dL   TYPE & SCREEN    Collection Time: 12/25/19  7:22 PM   Result Value Ref Range    Crossmatch Expiration 12/28/2019     ABO/Rh(D) A POSITIVE     Antibody screen NEG    DRUG SCREEN, URINE    Collection Time: 12/25/19  8:00 PM   Result Value Ref Range    PCP(PHENCYCLIDINE) NEGATIVE       BENZODIAZEPINES NEGATIVE       COCAINE NEGATIVE       AMPHETAMINES NEGATIVE       METHADONE NEGATIVE       THC (TH-CANNABINOL) NEGATIVE       OPIATES NEGATIVE       BARBITURATES NEGATIVE      EKG, 12 LEAD, INITIAL    Collection Time: 12/25/19  9:48 PM   Result Value Ref Range    Ventricular Rate 76 BPM    Atrial Rate 76 BPM    P-R Interval 150 ms    QRS Duration 108 ms    Q-T Interval 388 ms    QTC Calculation (Bezet) 436 ms    Calculated P Axis 62 degrees    Calculated R Axis -52 degrees    Calculated T Axis 8 degrees    Diagnosis       Normal sinus rhythm  Left axis deviation  Nonspecific ST abnormality  Abnormal ECG  No previous ECGs available  Confirmed by Lydia Yang MD (), THALIA SUH ([id number]) on 12/26/2019 8:24:42 AM     GLUCOSE, POC    Collection Time: 12/26/19 12:14 AM   Result Value Ref Range    Glucose (POC) 165 (H) 65 - 100 mg/dL   CBC W/O DIFF    Collection Time: 12/26/19  3:14 AM   Result Value Ref Range    WBC 8.8 4.3 - 11.1 K/uL    RBC 5.06 4.23 - 5.6 M/uL    HGB 15.2 13.6 - 17.2 g/dL    HCT 44.3 41.1 - 50.3 %    MCV 87.5 79.6 - 97.8 FL    MCH 30.0 26.1 - 32.9 PG    MCHC 34.3 31.4 - 35.0 g/dL    RDW 13.0 11.9 - 14.6 %    PLATELET 391 185 - 048 K/uL    MPV 9.8 9.4 - 12.3 FL    ABSOLUTE NRBC 0.00 0.0 - 0.2 K/uL   METABOLIC PANEL, BASIC    Collection Time: 12/26/19  3:14 AM   Result Value Ref Range    Sodium 142 136 - 145 mmol/L    Potassium 3.8 3.5 - 5.1 mmol/L    Chloride 109 (H) 98 - 107 mmol/L    CO2 27 21 - 32 mmol/L    Anion gap 6 (L) 7 - 16 mmol/L    Glucose 135 (H) 65 - 100 mg/dL    BUN 15 8 - 23 MG/DL    Creatinine 0.92 0.8 - 1.5 MG/DL    GFR est AA >60 >60 ml/min/1.73m2    GFR est non-AA >60 >60 ml/min/1.73m2    Calcium 9.1 8.3 - 10.4 MG/DL   MAGNESIUM    Collection Time: 12/26/19  3:14 AM   Result Value Ref Range    Magnesium 2.2 1.8 - 2.4 mg/dL   GLUCOSE, POC    Collection Time: 12/26/19  5:18 AM   Result Value Ref Range    Glucose (POC) 135 (H) 65 - 100 mg/dL   GLUCOSE, POC    Collection Time: 12/26/19 10:16 AM   Result Value Ref Range    Glucose (POC) 165 (H) 65 - 100 mg/dL   GLUCOSE, POC    Collection Time: 12/26/19  5:26 PM   Result Value Ref Range    Glucose (POC) 143 (H) 65 - 100 mg/dL   GLUCOSE, POC    Collection Time: 12/26/19 11:19 PM   Result Value Ref Range    Glucose (POC) 312 (H) 65 - 100 mg/dL   CBC W/O DIFF    Collection Time: 12/27/19  3:06 AM   Result Value Ref Range    WBC 7.3 4.3 - 11.1 K/uL    RBC 4.79 4.23 - 5.6 M/uL    HGB 14.2 13.6 - 17.2 g/dL    HCT 42.6 41.1 - 50.3 %    MCV 88.9 79.6 - 97.8 FL    MCH 29.6 26.1 - 32.9 PG    MCHC 33.3 31.4 - 35.0 g/dL    RDW 13.2 11.9 - 14.6 %    PLATELET 880 520 - 316 K/uL    MPV 9.9 9.4 - 12.3 FL    ABSOLUTE NRBC 0.00 0.0 - 0.2 K/uL   MAGNESIUM    Collection Time: 12/27/19  3:06 AM   Result Value Ref Range    Magnesium 2.3 1.8 - 2.4 mg/dL   METABOLIC PANEL, BASIC    Collection Time: 12/27/19  3:06 AM   Result Value Ref Range    Sodium 143 136 - 145 mmol/L    Potassium 3.9 3.5 - 5.1 mmol/L    Chloride 112 (H) 98 - 107 mmol/L    CO2 25 21 - 32 mmol/L    Anion gap 6 (L) 7 - 16 mmol/L    Glucose 203 (H) 65 - 100 mg/dL    BUN 18 8 - 23 MG/DL    Creatinine 0.91 0.8 - 1.5 MG/DL    GFR est AA >60 >60 ml/min/1.73m2    GFR est non-AA >60 >60 ml/min/1.73m2    Calcium 8.5 8.3 - 10.4 MG/DL   ASPIRIN TEST    Collection Time: 12/27/19  3:06 AM   Result Value Ref Range    Aspirin test 566 (L) 620 - 672 ARU   PLATELET FUNCTION, VERIFY NOW P2Y12    Collection Time: 12/27/19  3:06 AM   Result Value Ref Range    P2Y12 Plt response 197 PRU   GLUCOSE, POC    Collection Time: 12/27/19  6:12 AM   Result Value Ref Range    Glucose (POC) 158 (H) 65 - 100 mg/dL   GLUCOSE, POC    Collection Time: 12/27/19 10:40 AM   Result Value Ref Range    Glucose (POC) 226 (H) 65 - 100 mg/dL       Functional Assessment:  Functional Assessment  Fall in Past 12 Months: No  Decline in Gait/Transfer/Balance: No  Decline in Capacity to Feed/Dress/Bathe: No  Developmental Delay: No  Chewing/Swallowing Problems: No  Difficulty with Secretions: No  Speech Slurred/Thick/Garbled: No     Figueroa Score:        Speech Assessment:    Dysphagia Screening  Vocal Quality/Secretions: Normal  History of Dysphagia: No  O2 Saturation: Normal  Alertness: Normal  Pre-Swallow Assessment Score: 0  Purees: No difficulty noted  Water by Cup: No difficulty noted  Water by Straw: No difficulty noted                Ambulation:  Activity and Safety  Activity Level: Up with Assistance  Ambulate: No (Comment)  So's Egress Test: Fail  Activity: In bed, Family/Visitors present  Activity Assistance: Partial (one person)  Weight Bearing Status: WBAT (Weight Bearing as Tolerated)  Mode of Transportation: Stretcher, IV equipment  Repositioned: Head of bed elevated (degrees)  Patient Turned: Turns self  Head of Bed Elevated: HOB 30  Activity Response: Fairly tolerated  Assistive Device: Fall prevention device  Safety Measures: Bed/Chair alarm on, Bed/Chair-Wheels locked, Bed in low position, Call light within reach, Gripper socks, Side rails X 3, Video Monitoring for Safety     Impression/Plan:     Active Problems:    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 Severe ataxia with decreased coordination and proprioception affecting all mobility and self care. He will benefit from and , no doubt, tolerate [facility] acute rehab with 3hrs /therapy at least 5d/week.  He will benefit from ongoing ST as well to maximize improvements in dysarthria. (I do think ill fitting dentures play role as well)  -poorly controlled DM; will benefit from ongoing treatment and further dietary education   -cont ASA/Plavix and statin treatment. -SBP goal < 180. Cont Zestril  -vertigo/dizziness/diplopia; vision improving per pt. Currently on Meclizine. Highly ataxic. N/v improving.  Will benefit from vestibular therapies  -will begin Insurance Precert for hopeful admission Monday 12/30    Discussion with Family/Caregiver/Staff  Thank you for this kind referral  Reviewed Therapies/Labs/Meds/Records  Nba Muhammad MD

## 2019-12-27 NOTE — PROGRESS NOTES
A follow up visit was made to the patient. Emotional support, spiritual presence and   prayer were provided for the patient and his wife, Jeana Meek. Jeana Meek said that her  was to be moved to a private room today. Lisa Queen, [address]

## 2019-12-27 NOTE — PROGRESS NOTES
Acknowledge consult received for patient admitted for CVA work-up. Patient has been seen by SLP and is now receiving an oral diet. Note that patient's Malnutrition Screening Tool did not trigger assessment and nutrition assessment is deferred at this time. RD to f/u per standards of care or MD consult if prior.     Yoselin Smiley, [address]  Office: [phone]

## 2019-12-27 NOTE — PROGRESS NOTES
Bedside, Verbal and Written shift change report given to Shruti Reynoso RN (oncoming nurse) by Landy Andrade RN (offgoing nurse). Report included the following information SBAR, Kardex, ED Summary, Procedure Summary, Intake/Output, MAR, Recent Results, Med Rec Status, Cardiac Rhythm st and Alarm Parameters . Dual neuro completed.

## 2019-12-27 NOTE — PROGRESS NOTES
CM acknowledged consult for discharge planning related to diagnosis. Patient is alert and sitting in recliner. Slight alteration in speech noted. Wife has gone to business office to updated insurance information. Patient states that he lives with his spouse in a one story home with approximately two stairs to enter. Patient has no DME, is independent in all ADLs and drives. Demographics, insurance and PCP verified. PT/OT recommending [facility] and Dr Mag Saeed has visited with patient and pre-cert has been initiated by [facility]. CM will continue to follow for discharge planning. Care Management Interventions  PCP Verified by CM:  Yes  Transition of Care Consult (CM Consult): Discharge Planning, Other(PT/OT recommending [facility])  Physical Therapy Consult: Yes  Occupational Therapy Consult: Yes  Speech Therapy Consult: Yes  Current Support Network: Own Home, Lives with Spouse  Confirm Follow Up Transport: Family  Discharge Location  Discharge Placement: Unable to determine at this time

## 2019-12-27 NOTE — PROGRESS NOTES
12/27/19 0938   NIH Stroke Scale   Interval   (dual NIH with TreyRN)   LOC 0   LOC Questions 0   LOC Commands 0   Best Gaze 0   Visual 0   Facial Palsy 0   Motor Right Arm 0   Motor Left Arm 0   Motor Right Leg 0   Motor Left Leg 0   Limb Ataxia 0   Sensory 0   Best Language 0   Dysarthria 1  (very mild)   Extinction and Inattention 0   Total 1

## 2019-12-27 NOTE — PROGRESS NOTES
SPEECH LANGUAGE PATHOLOGY: DYSPHAGIA- Initial Assessment    NAME/AGE/GENDER: Minor Carrillo is a 59 y.o. male  DATE: 12/27/2019  PRIMARY DIAGNOSIS: Stroke due to embolism of left vertebral artery ([facility] [address] [I63.112]      ICD-10: Treatment Diagnosis: R13.12 Dysphagia, Oropharyngeal Phase    INTERDISCIPLINARY COLLABORATION: Registered Nurse  PRECAUTIONS/ALLERGIES: Sulfur       SUBJECTIVE   Seen with noontime meal for dysphagia assessment. Sitting up in bedside chair. He reports nausea has resolved. He and his wife report \"lisp\" from ill fitting dentures and state speech is back at baseline. History of Present Injury/Illness: Mr. Theodora Harp  has a past medical history of Diabetes ([facility] [address] Hypertension, and MI (myocardial infarction) ([facility] [address] (2002). Candance Huger He also  has a past surgical history that includes hx coronary stent placement (2002) and ir perc art mech thromb infusion intracranial (12/25/2019). Problem List:  (Impairments causing functional limitations):  1. Oropharyngeal dysphagia- No symptoms identified  2.   3.    Previous Dysphagia: NONE REPORTED    Diet Prior to Evaluation: Regular/thin       Orientation:   Person  Place  Time  Situation    Pain: Pain Scale 1: Numeric (0 - 10)  Pain Intensity 1: 0       Cognitive-Linguistic Screen:   Speech Production:   o WNL   Expressive Language:  o WNL  o Needs further assessment   Receptive Language:  o WNL   Cognition:   o Needs further assessment    Patient communicating at the conversational level. Appropriate word finding and responses to open ended questions. Good recall of recent events. Feels he is at his cognitive-linguistic baseline. However, would benefit from full assessment of higher level cognitive-linguistic abilities given independence and working full time at baseline.         OBJECTIVE   Oral Motor:   · Labial: No impairment  · Dentition: Intact  · Oral Hygiene: Adequate  · Lingual: No impairment     Swallow assessment:   Patient presented with thin liquid via cup and straw, puree, mixed, and solid consistencies. Appropriate oral prep with all textures. Timely swallow initiation, and single swallows upon palpation. Adequate oral clearing. No overt signs or symptoms of airway compromise observed with liquid or solid textures. ASSESSMENT   Patient presents with oropharyngeal swallow function that is within normal limits. Recommend continue regular diet/thin liquids. Medications whole with liquid wash. No further dysphagia treatment indicated at this time. Will follow up for full assessment of higher level cognitive-linguistic abilities. Tool Used: Dysphagia Outcome and Severity Scale (JUAN)    Score Comments   Normal Diet  [] 7 With no strategies or extra time needed   Functional Swallow  [] 6 May have mild oral or pharyngeal delay   Mild Dysphagia  [] 5 Which may require one diet consistency restricted    Mild-Moderate Dysphagia  [] 4 With 1-2 diet consistencies restricted   Moderate Dysphagia  [] 3 With 2 or more diet consistencies restricted   Moderate-Severe Dysphagia  [] 2 With partial PO strategies (trials with ST only)   Severe Dysphagia  [] 1 With inability to tolerate any PO safely      Score:  Initial: 7 Most Recent: x (Date 12/27/19 )   Interpretation of Tool: The Dysphagia Outcome and Severity Scale (JUAN) is a simple, easy-to-use, 7-point scale developed to systematically rate the functional severity of dysphagia based on objective assessment and make recommendations for diet level, independence level, and type of nutrition. Current Medications:   No current facility-administered medications on file prior to encounter. Current Outpatient Medications on File Prior to Encounter   Medication Sig Dispense Refill    aspirin delayed-release 81 mg tablet Take  by mouth daily.  atorvastatin (LIPITOR) 40 mg tablet Take 1 Tab by mouth daily. 90 Tab 3    empagliflozin (JARDIANCE) 10 mg tablet Take 1 Tab by mouth daily.  90 Tab 3    metoprolol succinate (TOPROL-XL) 100 mg tablet TAKE 1 TABLET DAILY 90 Tab 3    metFORMIN (GLUCOPHAGE) 500 mg tablet Take 1 Tab by mouth two (2) times daily (with meals). 180 Tab 4    lisinopril (PRINIVIL, ZESTRIL) 20 mg tablet Take 1 Tab by mouth daily. 90 Tab 3    nitroglycerin (NITROSTAT) 0.4 mg SL tablet 1 Tab by SubLINGual route every five (5) minutes as needed for Chest Pain. 1 Bottle 3    Blood-Glucose Meter monitoring kit TEST DAILY 1 Kit 0    glucose blood VI test strips (ASCENSIA AUTODISC VI, ONE TOUCH ULTRA TEST VI) strip TEST DAILY 100 Strip 5    Lancets misc TEST DAILY 1 Each 11         PLAN    FREQUENCY/DURATION: Speech therapy to follow up for assessment of cognitive-linguistic function.     - Recommendations for next treatment session: Next treatment will address cognitive-linguistic abilities     REHABILITATION POTENTIAL FOR STATED GOALS: Excellent         RECOMMENDATIONS   DIET:    PO:  Regular   Liquids:  regular thin    MEDICATIONS: With liquid     ASPIRATION PRECAUTIONS  · Slow rate of intake  · Small bites/sips  · Upright at 90 degrees during meal     COMPENSATORY STRATEGIES/MODIFICATIONS  · None     EDUCATION:  · Recommendations discussed with Nursing  · Family  · Patient     RECOMMENDATIONS for CONTINUED SPEECH THERAPY:   YES: Anticipate need for ongoing speech therapy during this hospitalization.        SAFETY:  After treatment position/precautions:  · Up in chair  · Wife and RN at bedside    Total Treatment Duration:   Time In: 1127  Time Out: [address], [address] CCC-SLP

## 2019-12-27 NOTE — PROGRESS NOTES
Progress Note    Patient: Sharene Opitz MRN: [id number]  SSN: [id number]    YOB: 1955  Age: 59 y.o. Sex: male      Admit Date: 12/25/2019    LOS: 2 days     Subjective:   Pt looks great this am, tolerating foods/fluids now. Able to walk in room last pm without nv/dizziness. Phenergan working for NV.  R groin CDI, pulses intact  Transfer to 7th floor this am.     Current Facility-Administered Medications   Medication Dose Route Frequency    insulin regular (NOVOLIN R, HUMULIN R) injection   SubCUTAneous AC&HS    0.9% sodium chloride infusion  75 mL/hr IntraVENous CONTINUOUS    enoxaparin (LOVENOX) injection 40 mg  40 mg SubCUTAneous Q24H    senna (SENOKOT) tablet 17.2 mg  2 Tab Oral QHS    meclizine (ANTIVERT) tablet 25 mg  25 mg Oral Q6H    promethazine (PHENERGAN) with saline injection 6.25 mg  6.25 mg IntraVENous Q6H PRN    acetaminophen (TYLENOL) tablet 650 mg  650 mg Oral Q4H PRN    NUTRITIONAL SUPPORT ELECTROLYTE PRN ORDERS   Does Not Apply PRN    aspirin tablet 325 mg  325 mg Oral DAILY    clopidogrel (PLAVIX) tablet 75 mg  75 mg Oral DAILY    ondansetron (ZOFRAN) injection 4 mg  4 mg IntraVENous Q6H PRN    atorvastatin (LIPITOR) tablet 40 mg  40 mg Oral DAILY    lisinopril (PRINIVIL, ZESTRIL) tablet 20 mg  20 mg Oral DAILY    HYDROcodone-acetaminophen (NORCO) 7.5-325 mg per tablet 1 Tab  1 Tab Oral Q4H PRN       Objective:     Vitals:    12/27/19 0800 12/27/19 0900 12/27/19 0939 12/27/19 1200   BP: 144/78 144/71 158/82 135/83   Pulse: 70 78 76 77   Resp: 20 20 19 18   Temp:   97.9 °F (36.6 °C) 97.8 °F (36.6 °C)   SpO2: 92% 93% 95% 95%   Weight:       Height:             Intake and Output:  Current Shift: No intake/output data recorded. Last 24 hr: 12/26 0701 - 12/27 0700  In: 2179 [I.V.:2179]  Out: 2850 [Urine:2850]    Physical Exam:   General:  Alert, cooperative, no distress, appears stated age. Eyes:  PERRL +3, R 6th nerve.  +nystagmus   Neck: Supple, symmetrical, trachea midline, no adenopathy, thyroid: no enlargment/tenderness/nodules, no carotid bruit and no JVD. Lungs:   Clear to auscultation bilaterally. Heart:  Regular rate and rhythm, S1, S2 normal, no murmur, click, rub or gallop. Abdomen:   Soft, non-tender. Bowel sounds normal. No masses,  No organomegaly. Extremities: Extremities normal, atraumatic, no cyanosis or edema. Pulses: 2+ and symmetric all extremities. Skin: Skin color, texture, turgor normal. No rashes or lesions. R groin CDI. Neurologic: AOX3, following commands. Maew this am. Speech improved.         Lab/Data Review:    Recent Results (from the past 12 hour(s))   CBC W/O DIFF    Collection Time: 12/27/19  3:06 AM   Result Value Ref Range    WBC 7.3 4.3 - 11.1 K/uL    RBC 4.79 4.23 - 5.6 M/uL    HGB 14.2 13.6 - 17.2 g/dL    HCT 42.6 41.1 - 50.3 %    MCV 88.9 79.6 - 97.8 FL    MCH 29.6 26.1 - 32.9 PG    MCHC 33.3 31.4 - 35.0 g/dL    RDW 13.2 11.9 - 14.6 %    PLATELET 249 267 - 329 K/uL    MPV 9.9 9.4 - 12.3 FL    ABSOLUTE NRBC 0.00 0.0 - 0.2 K/uL   MAGNESIUM    Collection Time: 12/27/19  3:06 AM   Result Value Ref Range    Magnesium 2.3 1.8 - 2.4 mg/dL   METABOLIC PANEL, BASIC    Collection Time: 12/27/19  3:06 AM   Result Value Ref Range    Sodium 143 136 - 145 mmol/L    Potassium 3.9 3.5 - 5.1 mmol/L    Chloride 112 (H) 98 - 107 mmol/L    CO2 25 21 - 32 mmol/L    Anion gap 6 (L) 7 - 16 mmol/L    Glucose 203 (H) 65 - 100 mg/dL    BUN 18 8 - 23 MG/DL    Creatinine 0.91 0.8 - 1.5 MG/DL    GFR est AA >60 >60 ml/min/1.73m2    GFR est non-AA >60 >60 ml/min/1.73m2    Calcium 8.5 8.3 - 10.4 MG/DL   ASPIRIN TEST    Collection Time: 12/27/19  3:06 AM   Result Value Ref Range    Aspirin test 566 (L) 620 - 672 ARU   PLATELET FUNCTION, VERIFY NOW P2Y12    Collection Time: 12/27/19  3:06 AM   Result Value Ref Range    P2Y12 Plt response 197 PRU   GLUCOSE, POC    Collection Time: 12/27/19  6:12 AM   Result Value Ref Range    Glucose (POC) 158 (H) 65 - 100 mg/dL   GLUCOSE, POC    Collection Time: 12/27/19 10:40 AM   Result Value Ref Range    Glucose (POC) 226 (H) 65 - 100 mg/dL       Assessment/ Plan: Active Problems:    Stroke due to embolism of left vertebral artery ([address] (12/25/2019)      NEURO:pt admitted to ICU post MEEK of left vert artery and angioplasty for ischemic stroke protocol. Pt with left vert artery stenosis and thrombus. Pt had repeat imaging this am and stable. Left vert artery dissection stable, on asa/plavix-will send responses in am. MRI brain done, small posterior stroke noted. PT/OT/ST to see pt. Airway patent. Pt for rehab IRC next week when approved. RESP:pt tolerating room air. CV:SBP goal <180. Trop neg. 2D Echo pending. SCD. Asa/plavix. LDL 57- lipitor 40mg daily. HEME: HH: 14/42,   NEPH: bun/cr: 18/0.9  GI: tolerating reg diet/fluids. Pepcid, senna. ID:afebrile, no abx  LINES:IVx2    Pt transferred to 7th floor. Updated this am at bedside with Dr. Rafa Montanez.    Signed By: Syed Mckeon NP     December 27, 2019

## 2019-12-27 NOTE — PROGRESS NOTES
TRANSFER - OUT REPORT:    Verbal report given to Keyanna JOE(name) on Lexa Mccarthy  being transferred to [facility](unit) for routine progression of care       Report consisted of patients Situation, Background, Assessment and   Recommendations(SBAR). Information from the following report(s) SBAR, Kardex, Procedure Summary, Intake/Output, MAR, Recent Results and Cardiac Rhythm NSR was reviewed with the receiving nurse. Lines:   Peripheral IV 12/25/19 Left Antecubital (Active)   Site Assessment Clean, dry, & intact 12/26/2019  8:00 PM   Phlebitis Assessment 0 12/26/2019  8:00 PM   Infiltration Assessment 0 12/26/2019  8:00 PM   Dressing Status Clean, dry, & intact 12/26/2019  8:00 PM   Dressing Type Transparent;Tape 12/26/2019  8:00 PM   Hub Color/Line Status Green;Patent; Flushed 12/26/2019  8:00 PM   Alcohol Cap Used No 12/26/2019  8:00 PM       Peripheral IV 12/25/19 Anterior; Left Forearm (Active)   Site Assessment Clean, dry, & intact 12/26/2019  8:00 PM   Phlebitis Assessment 0 12/26/2019  8:00 PM   Infiltration Assessment 0 12/26/2019  8:00 PM   Dressing Status Clean, dry, & intact 12/26/2019  8:00 PM   Dressing Type Transparent;Tape 12/26/2019  8:00 PM   Hub Color/Line Status Green;Patent; Flushed 12/26/2019  8:00 PM   Alcohol Cap Used No 12/26/2019  8:00 PM        Opportunity for questions and clarification was provided.       Patient transported with:   Registered Nurse; belongings; telebox

## 2019-12-28 LAB
ANION GAP SERPL CALC-SCNC: 4 MMOL/L (ref 7–16)
BUN SERPL-MCNC: 16 MG/DL (ref 8–23)
CALCIUM SERPL-MCNC: 8.8 MG/DL (ref 8.3–10.4)
CHLORIDE SERPL-SCNC: 112 MMOL/L (ref 98–107)
CO2 SERPL-SCNC: 28 MMOL/L (ref 21–32)
CREAT SERPL-MCNC: 0.97 MG/DL (ref 0.8–1.5)
ERYTHROCYTE [DISTWIDTH] IN BLOOD BY AUTOMATED COUNT: 13.2 % (ref 11.9–14.6)
GLUCOSE BLD STRIP.AUTO-MCNC: 139 MG/DL (ref 65–100)
GLUCOSE BLD STRIP.AUTO-MCNC: 191 MG/DL (ref 65–100)
GLUCOSE BLD STRIP.AUTO-MCNC: 206 MG/DL (ref 65–100)
GLUCOSE BLD STRIP.AUTO-MCNC: 208 MG/DL (ref 65–100)
GLUCOSE SERPL-MCNC: 136 MG/DL (ref 65–100)
HCT VFR BLD AUTO: 43.2 % (ref 41.1–50.3)
HGB BLD-MCNC: 14.4 G/DL (ref 13.6–17.2)
MAGNESIUM SERPL-MCNC: 2.2 MG/DL (ref 1.8–2.4)
MCH RBC QN AUTO: 30.1 PG (ref 26.1–32.9)
MCHC RBC AUTO-ENTMCNC: 33.3 G/DL (ref 31.4–35)
MCV RBC AUTO: 90.4 FL (ref 79.6–97.8)
NRBC # BLD: 0 K/UL (ref 0–0.2)
PLATELET # BLD AUTO: 154 K/UL (ref 150–450)
PMV BLD AUTO: 9.7 FL (ref 9.4–12.3)
POTASSIUM SERPL-SCNC: 4 MMOL/L (ref 3.5–5.1)
RBC # BLD AUTO: 4.78 M/UL (ref 4.23–5.6)
SODIUM SERPL-SCNC: 144 MMOL/L (ref 136–145)
WBC # BLD AUTO: 5.6 K/UL (ref 4.3–11.1)

## 2019-12-28 PROCEDURE — 99231 SBSQ HOSP IP/OBS SF/LOW 25: CPT | Performed by: NURSE PRACTITIONER

## 2019-12-28 PROCEDURE — 82962 GLUCOSE BLOOD TEST: CPT

## 2019-12-28 PROCEDURE — 74011250637 HC RX REV CODE- 250/637: Performed by: NURSE PRACTITIONER

## 2019-12-28 PROCEDURE — 74011250636 HC RX REV CODE- 250/636: Performed by: NURSE PRACTITIONER

## 2019-12-28 PROCEDURE — 74011636637 HC RX REV CODE- 636/637: Performed by: NURSE PRACTITIONER

## 2019-12-28 PROCEDURE — 65270000029 HC RM PRIVATE

## 2019-12-28 PROCEDURE — 80048 BASIC METABOLIC PNL TOTAL CA: CPT

## 2019-12-28 PROCEDURE — 36415 COLL VENOUS BLD VENIPUNCTURE: CPT

## 2019-12-28 PROCEDURE — 85027 COMPLETE CBC AUTOMATED: CPT

## 2019-12-28 PROCEDURE — 83735 ASSAY OF MAGNESIUM: CPT

## 2019-12-28 RX ADMIN — ATORVASTATIN CALCIUM 40 MG: 40 TABLET, FILM COATED ORAL at 08:58

## 2019-12-28 RX ADMIN — CLOPIDOGREL BISULFATE 75 MG: 75 TABLET ORAL at 08:58

## 2019-12-28 RX ADMIN — MECLIZINE HYDROCHLORIDE 25 MG: 25 TABLET ORAL at 11:41

## 2019-12-28 RX ADMIN — ASPIRIN 325 MG ORAL TABLET 325 MG: 325 PILL ORAL at 08:58

## 2019-12-28 RX ADMIN — INSULIN HUMAN 2 UNITS: 100 INJECTION, SOLUTION PARENTERAL at 11:40

## 2019-12-28 RX ADMIN — ENOXAPARIN SODIUM 40 MG: 40 INJECTION SUBCUTANEOUS at 11:41

## 2019-12-28 RX ADMIN — SENNOSIDES 17.2 MG: 8.6 TABLET, FILM COATED ORAL at 22:38

## 2019-12-28 RX ADMIN — INSULIN HUMAN 4 UNITS: 100 INJECTION, SOLUTION PARENTERAL at 22:38

## 2019-12-28 RX ADMIN — MECLIZINE HYDROCHLORIDE 25 MG: 25 TABLET ORAL at 17:14

## 2019-12-28 RX ADMIN — MECLIZINE HYDROCHLORIDE 25 MG: 25 TABLET ORAL at 00:19

## 2019-12-28 RX ADMIN — LISINOPRIL 20 MG: 20 TABLET ORAL at 08:58

## 2019-12-28 RX ADMIN — MECLIZINE HYDROCHLORIDE 25 MG: 25 TABLET ORAL at 05:52

## 2019-12-28 RX ADMIN — INSULIN HUMAN 4 UNITS: 100 INJECTION, SOLUTION PARENTERAL at 17:14

## 2019-12-28 NOTE — PROGRESS NOTES
12/27/19 1902   NIH Stroke Scale   Interval Other (comment)  (dual NIH with Syeda RN)   LOC 0   LOC Questions 0   LOC Commands 0   Best Gaze 0   Visual 0   Facial Palsy 0   Motor Right Arm 0   Motor Left Arm 0   Motor Right Leg 0   Motor Left Leg 0   Limb Ataxia 0   Sensory 0   Best Language 0   Dysarthria 1   Extinction and Inattention 0   Total 1

## 2019-12-28 NOTE — PROGRESS NOTES
Progress Note    Patient: Janel Anton MRN: [id number]  SSN: [id number]    YOB: 1955  Age: 59 y.o. Sex: male      Admit Date: 12/25/2019    LOS: 3 days     Subjective:   Looks good this am. No acute changes    Current Facility-Administered Medications   Medication Dose Route Frequency    insulin regular (NOVOLIN R, HUMULIN R) injection   SubCUTAneous AC&HS    0.9% sodium chloride infusion  75 mL/hr IntraVENous CONTINUOUS    enoxaparin (LOVENOX) injection 40 mg  40 mg SubCUTAneous Q24H    senna (SENOKOT) tablet 17.2 mg  2 Tab Oral QHS    meclizine (ANTIVERT) tablet 25 mg  25 mg Oral Q6H    promethazine (PHENERGAN) with saline injection 6.25 mg  6.25 mg IntraVENous Q6H PRN    acetaminophen (TYLENOL) tablet 650 mg  650 mg Oral Q4H PRN    NUTRITIONAL SUPPORT ELECTROLYTE PRN ORDERS   Does Not Apply PRN    aspirin tablet 325 mg  325 mg Oral DAILY    clopidogrel (PLAVIX) tablet 75 mg  75 mg Oral DAILY    ondansetron (ZOFRAN) injection 4 mg  4 mg IntraVENous Q6H PRN    atorvastatin (LIPITOR) tablet 40 mg  40 mg Oral DAILY    lisinopril (PRINIVIL, ZESTRIL) tablet 20 mg  20 mg Oral DAILY    HYDROcodone-acetaminophen (NORCO) 7.5-325 mg per tablet 1 Tab  1 Tab Oral Q4H PRN       Objective:     Vitals:    12/27/19 1600 12/27/19 2000 12/28/19 0000 12/28/19 0400   BP: 134/81 120/75 138/77 118/67   Pulse: 82 72 67 (!) 56   Resp: 18 18 18 18   Temp: 97.5 °F (36.4 °C) 98 °F (36.7 °C) 98.2 °F (36.8 °C) 97.7 °F (36.5 °C)   SpO2: 93% 94% 96% 94%   Weight:       Height:             Intake and Output:  Current Shift: No intake/output data recorded. Last 24 hr: No intake/output data recorded. Physical Exam:   General:  Alert, cooperative, no distress, appears stated age. Eyes:  PERRL +3, R 6th nerve improving. Neck: Supple, symmetrical, trachea midline, no adenopathy, thyroid: no enlargment/tenderness/nodules, no carotid bruit and no JVD. Lungs:   Clear to auscultation bilaterally.    Heart: Regular rate and rhythm, S1, S2 normal, no murmur, click, rub or gallop. Abdomen:   Soft, non-tender. Bowel sounds normal. No masses,  No organomegaly. Extremities: Extremities normal, atraumatic, no cyanosis or edema. Pulses: 2+ and symmetric all extremities. Skin: Skin color, texture, turgor normal. No rashes or lesions. R groin CDI. Neurologic: AOX3, following commands. Maew this am. Speech improved. Lab/Data Review:    Recent Results (from the past 12 hour(s))   GLUCOSE, POC    Collection Time: 12/27/19 10:34 PM   Result Value Ref Range    Glucose (POC) 274 (H) 65 - 100 mg/dL       Assessment/ Plan: Active Problems:    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 Pt pending IRC admit on 12-20-19  2. Continue PT/OT/ST. 3. OOB daily.      Signed By: Trevon Carreon NP     December 28, 2019

## 2019-12-28 NOTE — PROGRESS NOTES
Problem: Diabetes Self-Management  Goal: *Disease process and treatment process  Description  Define diabetes and identify own type of diabetes; list 3 options for treating diabetes. Outcome: Progressing Towards Goal  Goal: *Incorporating nutritional management into lifestyle  Description  Describe effect of type, amount and timing of food on blood glucose; list 3 methods for planning meals. Outcome: Progressing Towards Goal  Goal: *Incorporating physical activity into lifestyle  Description  State effect of exercise on blood glucose levels. Outcome: Progressing Towards Goal  Goal: *Developing strategies to promote health/change behavior  Description  Define the ABC's of diabetes; identify appropriate screenings, schedule and personal plan for screenings. Outcome: Progressing Towards Goal  Goal: *Using medications safely  Description  State effect of diabetes medications on diabetes; name diabetes medication taking, action and side effects. Outcome: Progressing Towards Goal  Goal: *Monitoring blood glucose, interpreting and using results  Description  Identify recommended blood glucose targets  and personal targets. Outcome: Progressing Towards Goal  Goal: *Prevention, detection, treatment of acute complications  Description  List symptoms of hyper- and hypoglycemia; describe how to treat low blood sugar and actions for lowering  high blood glucose level. Outcome: Progressing Towards Goal  Goal: *Prevention, detection and treatment of chronic complications  Description  Define the natural course of diabetes and describe the relationship of blood glucose levels to long term complications of diabetes.   Outcome: Progressing Towards Goal  Goal: *Developing strategies to address psychosocial issues  Description  Describe feelings about living with diabetes; identify support needed and support network  Outcome: Progressing Towards Goal  Goal: *Insulin pump training  Outcome: Progressing Towards Goal  Goal: *Sick day guidelines  Outcome: Progressing Towards Goal  Goal: *Patient Specific Goal (EDIT GOAL, INSERT TEXT)  Outcome: Progressing Towards Goal     Problem: Patient Education: Go to Patient Education Activity  Goal: Patient/Family Education  Outcome: Progressing Towards Goal     Problem: Falls - Risk of  Goal: *Absence of Falls  Description  Document Mayito Goddardmilagros Fall Risk and appropriate interventions in the flowsheet. Outcome: Progressing Towards Goal  Note: Fall Risk Interventions:  Mobility Interventions: Bed/chair exit alarm, Communicate number of staff needed for ambulation/transfer         Medication Interventions: Bed/chair exit alarm, Evaluate medications/consider consulting pharmacy, Patient to call before getting OOB, Teach patient to arise slowly    Elimination Interventions: Bed/chair exit alarm, Call light in reach, Patient to call for help with toileting needs              Problem: Patient Education: Go to Patient Education Activity  Goal: Patient/Family Education  Outcome: Progressing Towards Goal     Problem: Pressure Injury - Risk of  Goal: *Prevention of pressure injury  Description  Document Taiwo Scale and appropriate interventions in the flowsheet. Outcome: Progressing Towards Goal  Note: Pressure Injury Interventions:             Activity Interventions: Increase time out of bed, Pressure redistribution bed/mattress(bed type)    Mobility Interventions: HOB 30 degrees or less, Pressure redistribution bed/mattress (bed type), PT/OT evaluation    Nutrition Interventions: Document food/fluid/supplement intake, Offer support with meals,snacks and hydration    Friction and Shear Interventions: HOB 30 degrees or less, Minimize layers                Problem: Patient Education: Go to Patient Education Activity  Goal: Patient/Family Education  Outcome: Progressing Towards Goal     Problem: Patient Education: Go to Patient Education Activity  Goal: Patient/Family Education  Outcome: Progressing Towards Goal Problem: TIA/CVA Stroke: 0-24 hours  Goal: Activity/Safety  Outcome: Progressing Towards Goal  Goal: Consults, if ordered  Outcome: Progressing Towards Goal  Goal: Diagnostic Test/Procedures  Outcome: Progressing Towards Goal  Goal: Nutrition/Diet  Outcome: Progressing Towards Goal  Goal: Discharge Planning  Outcome: Progressing Towards Goal  Goal: Medications  Outcome: Progressing Towards Goal  Goal: Respiratory  Outcome: Progressing Towards Goal  Goal: Treatments/Interventions/Procedures  Outcome: Progressing Towards Goal  Goal: Minimize risk of bleeding post-thrombolytic infusion  Outcome: Progressing Towards Goal  Goal: Monitor for complications post-thrombolytic infusion  Outcome: Progressing Towards Goal  Goal: Psychosocial  Outcome: Progressing Towards Goal  Goal: *Hemodynamically stable  Outcome: Progressing Towards Goal  Goal: *Neurologically stable  Description  Absence of additional neurological deficits    Outcome: Progressing Towards Goal  Goal: *Verbalizes anxiety and depression are reduced or absent  Outcome: Progressing Towards Goal  Goal: *Absence of Signs of Aspiration on Current Diet  Outcome: Progressing Towards Goal  Goal: *Absence of deep venous thrombosis signs and symptoms(Stroke Metric)  Outcome: Progressing Towards Goal  Goal: *Ability to perform ADLs and demonstrates progressive mobility and function  Outcome: Progressing Towards Goal  Goal: *Stroke education started(Stroke Metric)  Outcome: Progressing Towards Goal  Goal: *Dysphagia screen performed(Stroke Metric)  Outcome: Progressing Towards Goal  Goal: *Rehab consulted(Stroke Metric)  Outcome: Progressing Towards Goal     Problem: TIA/CVA Stroke: Day 2 Until Discharge  Goal: Activity/Safety  Outcome: Progressing Towards Goal  Goal: Diagnostic Test/Procedures  Outcome: Progressing Towards Goal  Goal: Nutrition/Diet  Outcome: Progressing Towards Goal  Goal: Discharge Planning  Outcome: Progressing Towards Goal  Goal: Medications  Outcome: Progressing Towards Goal  Goal: Respiratory  Outcome: Progressing Towards Goal  Goal: Treatments/Interventions/Procedures  Outcome: Progressing Towards Goal  Goal: Psychosocial  Outcome: Progressing Towards Goal  Goal: *Verbalizes anxiety and depression are reduced or absent  Outcome: Progressing Towards Goal  Goal: *Absence of aspiration  Outcome: Progressing Towards Goal  Goal: *Absence of deep venous thrombosis signs and symptoms(Stroke Metric)  Outcome: Progressing Towards Goal  Goal: *Optimal pain control at patient's stated goal  Outcome: Progressing Towards Goal  Goal: *Tolerating diet  Outcome: Progressing Towards Goal  Goal: *Ability to perform ADLs and demonstrates progressive mobility and function  Outcome: Progressing Towards Goal  Goal: *Stroke education continued(Stroke Metric)  Outcome: Progressing Towards Goal     Problem: Ischemic Stroke: Discharge Outcomes  Goal: *Verbalizes anxiety and depression are reduced or absent  Outcome: Progressing Towards Goal  Goal: *Verbalize understanding of risk factor modification(Stroke Metric)  Outcome: Progressing Towards Goal  Goal: *Hemodynamically stable  Outcome: Progressing Towards Goal  Goal: *Absence of aspiration pneumonia  Outcome: Progressing Towards Goal  Goal: *Aware of needed dietary changes  Outcome: Progressing Towards Goal  Goal: *Verbalize understanding of prescribed medications including anti-coagulants, anti-lipid, and/or anti-platelets(Stroke Metric)  Outcome: Progressing Towards Goal  Goal: *Tolerating diet  Outcome: Progressing Towards Goal  Goal: *Aware of follow-up diagnostics related to anticoagulants  Outcome: Progressing Towards Goal  Goal: *Ability to perform ADLs and demonstrates progressive mobility and function  Outcome: Progressing Towards Goal  Goal: *Absence of DVT(Stroke Metric)  Outcome: Progressing Towards Goal  Goal: *Absence of aspiration  Outcome: Progressing Towards Goal  Goal: *Optimal pain control at patient's stated goal  Outcome: Progressing Towards Goal  Goal: *Home safety concerns addressed  Outcome: Progressing Towards Goal  Goal: *Describes available resources and support systems  Outcome: Progressing Towards Goal  Goal: *Verbalizes understanding of activation of EMS(911) for stroke symptoms(Stroke Metric)  Outcome: Progressing Towards Goal  Goal: *Understands and describes signs and symptoms to report to providers(Stroke Metric)  Outcome: Progressing Towards Goal  Goal: *Neurolgocially stable (absence of additional neurological deficits)  Outcome: Progressing Towards Goal  Goal: *Verbalizes importance of follow-up with primary care physician(Stroke Metric)  Outcome: Progressing Towards Goal  Goal: *Smoking cessation discussed,if applicable(Stroke Metric)  Outcome: Progressing Towards Goal  Goal: *Depression screening completed(Stroke Metric)  Outcome: Progressing Towards Goal

## 2019-12-28 NOTE — PROGRESS NOTES
[facility] CRITICAL CARE OUTREACH NURSE PROGRESS REPORT      SUBJECTIVE: Called to assess patient secondary to transfer from ICU. MEWS Score: 1 (12/27/19 2000)  Vitals:    12/27/19 0939 12/27/19 1200 12/27/19 1600 12/27/19 2000   BP: 158/82 135/83 134/81 120/75   Pulse: 76 77 82 72   Resp: 19 18 18 18   Temp: 97.9 °F (36.6 °C) 97.8 °F (36.6 °C) 97.5 °F (36.4 °C) 98 °F (36.7 °C)   SpO2: 95% 95% 93% 94%   Weight:       Height:        LAB DATA:    Recent Labs     12/27/19  0306 12/26/19 0314 12/25/19  1922 12/25/19  1513    142 142 141   K 3.9 3.8 4.3 4.0   * 109* 108* 104   CO2 25 27 27 29   AGAP 6* 6* 7 8   * 135* 224* 236*   BUN 18 15 18 20   CREA 0.91 0.92 0.96 1.28   GFRAA >60 >60 >60 >60   GFRNA >60 >60 >60 >60   CA 8.5 9.1 8.8 9.9   MG 2.3 2.2 1.9  --    ALB  --   --  3.4 4.1   TP  --   --  6.2* 7.5   GLOB  --   --  2.8 3.4   AGRAT  --   --  1.2 1.2   ALT  --   --  22 31        Recent Labs     12/27/19  0306 12/26/19 0314 12/25/19  1513   WBC 7.3 8.8 7.0   HGB 14.2 15.2 18.6*   HCT 42.6 44.3 52.6*    186 205          OBJECTIVE: On arrival to room, I found patient to be sleeping. Pain Assessment  Pain Intensity 1: 0 (12/27/19 1157)        Patient Stated Pain Goal: 0    ASSESSMENT:  Patient sleeping, respirations even and unlabored. Vitals stable. NAD noted at this time. PLAN:  Will continue to follow per outreach protocol.

## 2019-12-28 NOTE — PROGRESS NOTES
Visit with patient to build rapport with .   Calm  Encouraged with presence and words of hope  Patient said he had a stroke  Very encouraged with his progress and care    Clover Najera,  Staff   C: [phone]  /  [email or web address]

## 2019-12-29 LAB
ANION GAP SERPL CALC-SCNC: 7 MMOL/L (ref 7–16)
BUN SERPL-MCNC: 14 MG/DL (ref 8–23)
CALCIUM SERPL-MCNC: 8.7 MG/DL (ref 8.3–10.4)
CHLORIDE SERPL-SCNC: 109 MMOL/L (ref 98–107)
CO2 SERPL-SCNC: 27 MMOL/L (ref 21–32)
CREAT SERPL-MCNC: 0.9 MG/DL (ref 0.8–1.5)
GLUCOSE BLD STRIP.AUTO-MCNC: 163 MG/DL (ref 65–100)
GLUCOSE BLD STRIP.AUTO-MCNC: 195 MG/DL (ref 65–100)
GLUCOSE BLD STRIP.AUTO-MCNC: 195 MG/DL (ref 65–100)
GLUCOSE BLD STRIP.AUTO-MCNC: 247 MG/DL (ref 65–100)
GLUCOSE SERPL-MCNC: 181 MG/DL (ref 65–100)
MAGNESIUM SERPL-MCNC: 2.2 MG/DL (ref 1.8–2.4)
POTASSIUM SERPL-SCNC: 3.9 MMOL/L (ref 3.5–5.1)
SODIUM SERPL-SCNC: 143 MMOL/L (ref 136–145)

## 2019-12-29 PROCEDURE — 82962 GLUCOSE BLOOD TEST: CPT

## 2019-12-29 PROCEDURE — 99231 SBSQ HOSP IP/OBS SF/LOW 25: CPT | Performed by: NURSE PRACTITIONER

## 2019-12-29 PROCEDURE — 74011636637 HC RX REV CODE- 636/637: Performed by: NURSE PRACTITIONER

## 2019-12-29 PROCEDURE — 74011250636 HC RX REV CODE- 250/636: Performed by: NURSE PRACTITIONER

## 2019-12-29 PROCEDURE — 36415 COLL VENOUS BLD VENIPUNCTURE: CPT

## 2019-12-29 PROCEDURE — 83735 ASSAY OF MAGNESIUM: CPT

## 2019-12-29 PROCEDURE — 80048 BASIC METABOLIC PNL TOTAL CA: CPT

## 2019-12-29 PROCEDURE — 74011250637 HC RX REV CODE- 250/637: Performed by: NURSE PRACTITIONER

## 2019-12-29 PROCEDURE — 65270000029 HC RM PRIVATE

## 2019-12-29 RX ADMIN — INSULIN HUMAN 2 UNITS: 100 INJECTION, SOLUTION PARENTERAL at 17:38

## 2019-12-29 RX ADMIN — ATORVASTATIN CALCIUM 40 MG: 40 TABLET, FILM COATED ORAL at 08:50

## 2019-12-29 RX ADMIN — CLOPIDOGREL BISULFATE 75 MG: 75 TABLET ORAL at 08:50

## 2019-12-29 RX ADMIN — INSULIN HUMAN 2 UNITS: 100 INJECTION, SOLUTION PARENTERAL at 22:49

## 2019-12-29 RX ADMIN — LISINOPRIL 20 MG: 20 TABLET ORAL at 08:50

## 2019-12-29 RX ADMIN — ENOXAPARIN SODIUM 40 MG: 40 INJECTION SUBCUTANEOUS at 12:50

## 2019-12-29 RX ADMIN — INSULIN HUMAN 4 UNITS: 100 INJECTION, SOLUTION PARENTERAL at 12:50

## 2019-12-29 RX ADMIN — SENNOSIDES 17.2 MG: 8.6 TABLET, FILM COATED ORAL at 22:49

## 2019-12-29 RX ADMIN — MECLIZINE HYDROCHLORIDE 25 MG: 25 TABLET ORAL at 01:45

## 2019-12-29 RX ADMIN — ASPIRIN 325 MG ORAL TABLET 325 MG: 325 PILL ORAL at 08:50

## 2019-12-29 RX ADMIN — MECLIZINE HYDROCHLORIDE 25 MG: 25 TABLET ORAL at 06:23

## 2019-12-29 RX ADMIN — INSULIN HUMAN 2 UNITS: 100 INJECTION, SOLUTION PARENTERAL at 08:51

## 2019-12-29 NOTE — PROGRESS NOTES
Progress Note    Patient: Lexa Mccarthy MRN: [id number]  SSN: [id number]    YOB: 1955  Age: 59 y.o. Sex: male      Admit Date: 12/25/2019    LOS: 4 days     Subjective:   Looks good this am. No acute changes    Current Facility-Administered Medications   Medication Dose Route Frequency    insulin regular (NOVOLIN R, HUMULIN R) injection   SubCUTAneous AC&HS    0.9% sodium chloride infusion  75 mL/hr IntraVENous CONTINUOUS    enoxaparin (LOVENOX) injection 40 mg  40 mg SubCUTAneous Q24H    senna (SENOKOT) tablet 17.2 mg  2 Tab Oral QHS    promethazine (PHENERGAN) with saline injection 6.25 mg  6.25 mg IntraVENous Q6H PRN    acetaminophen (TYLENOL) tablet 650 mg  650 mg Oral Q4H PRN    NUTRITIONAL SUPPORT ELECTROLYTE PRN ORDERS   Does Not Apply PRN    aspirin tablet 325 mg  325 mg Oral DAILY    clopidogrel (PLAVIX) tablet 75 mg  75 mg Oral DAILY    ondansetron (ZOFRAN) injection 4 mg  4 mg IntraVENous Q6H PRN    atorvastatin (LIPITOR) tablet 40 mg  40 mg Oral DAILY    lisinopril (PRINIVIL, ZESTRIL) tablet 20 mg  20 mg Oral DAILY    HYDROcodone-acetaminophen (NORCO) 7.5-325 mg per tablet 1 Tab  1 Tab Oral Q4H PRN       Objective:     Vitals:    12/29/19 0000 12/29/19 0400 12/29/19 0800 12/29/19 1200   BP: 129/66 122/75 122/74 (!) 160/94   Pulse: (!) 56 62 68 73   Resp: 19 18 19 19   Temp: 97.6 °F (36.4 °C) 97.9 °F (36.6 °C) 97.3 °F (36.3 °C) 99.3 °F (37.4 °C)   SpO2: 95% 93% 92% 93%   Weight:       Height:             Intake and Output:  Current Shift: 12/29 0701 - 12/29 1900  In: -   Out: 400 [Urine:400]  Last 24 hr: 12/28 0701 - 12/29 0700  In: -   Out: 300 [Urine:300]    Physical Exam:   General:  Alert, cooperative, no distress, appears stated age. Eyes:  PERRL +3, R 6th nerve improving. Neck: Supple, symmetrical, trachea midline, no adenopathy, thyroid: no enlargment/tenderness/nodules, no carotid bruit and no JVD. Lungs:   Clear to auscultation bilaterally.    Heart: Regular rate and rhythm, S1, S2 normal, no murmur, click, rub or gallop. Abdomen:   Soft, non-tender. Bowel sounds normal. No masses,  No organomegaly. Extremities: Extremities normal, atraumatic, no cyanosis or edema. Pulses: 2+ and symmetric all extremities. Skin: Skin color, texture, turgor normal. No rashes or lesions. R groin CDI. Neurologic: AOX3, following commands. Maew this am. Speech improved. Lab/Data Review:    Recent Results (from the past 12 hour(s))   MAGNESIUM    Collection Time: 12/29/19  5:51 AM   Result Value Ref Range    Magnesium 2.2 1.8 - 2.4 mg/dL   METABOLIC PANEL, BASIC    Collection Time: 12/29/19  5:51 AM   Result Value Ref Range    Sodium 143 136 - 145 mmol/L    Potassium 3.9 3.5 - 5.1 mmol/L    Chloride 109 (H) 98 - 107 mmol/L    CO2 27 21 - 32 mmol/L    Anion gap 7 7 - 16 mmol/L    Glucose 181 (H) 65 - 100 mg/dL    BUN 14 8 - 23 MG/DL    Creatinine 0.90 0.8 - 1.5 MG/DL    GFR est AA >60 >60 ml/min/1.73m2    GFR est non-AA >60 >60 ml/min/1.73m2    Calcium 8.7 8.3 - 10.4 MG/DL   GLUCOSE, POC    Collection Time: 12/29/19  7:39 AM   Result Value Ref Range    Glucose (POC) 195 (H) 65 - 100 mg/dL   GLUCOSE, POC    Collection Time: 12/29/19 11:26 AM   Result Value Ref Range    Glucose (POC) 247 (H) 65 - 100 mg/dL       Assessment/ Plan: Active Problems:    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 Pt pending IRC admit on 12-20-19  2. Continue PT/OT/ST. 3. OOB daily.      Signed By: Leonardo Wallace NP     December 29, 2019

## 2019-12-29 NOTE — PROGRESS NOTES
12/29/19 0709   NIH Stroke Scale   Interval Other (comment)  (Dual NIH w/Ebonie)   LOC 0   LOC Questions 0   LOC Commands 0   Best Gaze 0   Visual 0   Facial Palsy 0   Motor Right Arm 0   Motor Left Arm 0   Motor Right Leg 0   Motor Left Leg 0   Limb Ataxia 0   Sensory 0   Best Language 0   Dysarthria 0   Extinction and Inattention 0   Total 0

## 2019-12-29 NOTE — PROGRESS NOTES
12/28/19 1900   NIH Stroke Scale   Interval Other (comment)   LOC 0   LOC Questions 0   LOC Commands 0   Best Gaze 0   Visual 0   Facial Palsy 0   Motor Right Arm 0   Motor Left Arm 0   Motor Right Leg 0   Motor Left Leg 0   Limb Ataxia 0   Sensory 0   Best Language 0   Dysarthria 0   Extinction and Inattention 0   Total 0   dual nih with TATYANA Johnson

## 2019-12-30 LAB
GLUCOSE BLD STRIP.AUTO-MCNC: 203 MG/DL (ref 65–100)
GLUCOSE BLD STRIP.AUTO-MCNC: 212 MG/DL (ref 65–100)
GLUCOSE BLD STRIP.AUTO-MCNC: 241 MG/DL (ref 65–100)
GLUCOSE BLD STRIP.AUTO-MCNC: 256 MG/DL (ref 65–100)
MAGNESIUM SERPL-MCNC: 2 MG/DL (ref 1.8–2.4)

## 2019-12-30 PROCEDURE — 74011250637 HC RX REV CODE- 250/637: Performed by: NURSE PRACTITIONER

## 2019-12-30 PROCEDURE — 36415 COLL VENOUS BLD VENIPUNCTURE: CPT

## 2019-12-30 PROCEDURE — 97110 THERAPEUTIC EXERCISES: CPT

## 2019-12-30 PROCEDURE — 83735 ASSAY OF MAGNESIUM: CPT

## 2019-12-30 PROCEDURE — 99231 SBSQ HOSP IP/OBS SF/LOW 25: CPT | Performed by: NURSE PRACTITIONER

## 2019-12-30 PROCEDURE — 74011250636 HC RX REV CODE- 250/636: Performed by: NURSE PRACTITIONER

## 2019-12-30 PROCEDURE — 74011636637 HC RX REV CODE- 636/637: Performed by: NURSE PRACTITIONER

## 2019-12-30 PROCEDURE — 97116 GAIT TRAINING THERAPY: CPT

## 2019-12-30 PROCEDURE — 82962 GLUCOSE BLOOD TEST: CPT

## 2019-12-30 PROCEDURE — 65270000029 HC RM PRIVATE

## 2019-12-30 RX ORDER — GUAIFENESIN 100 MG/5ML
81 LIQUID (ML) ORAL DAILY
Status: DISCONTINUED | OUTPATIENT
Start: 2019-12-31 | End: 2020-01-02 | Stop reason: HOSPADM

## 2019-12-30 RX ORDER — GUAIFENESIN 100 MG/5ML
81 LIQUID (ML) ORAL DAILY
Status: DISCONTINUED | OUTPATIENT
Start: 2019-12-30 | End: 2019-12-30 | Stop reason: SDUPTHER

## 2019-12-30 RX ADMIN — TICAGRELOR 90 MG: 90 TABLET ORAL at 12:13

## 2019-12-30 RX ADMIN — ASPIRIN 325 MG ORAL TABLET 325 MG: 325 PILL ORAL at 08:07

## 2019-12-30 RX ADMIN — ATORVASTATIN CALCIUM 40 MG: 40 TABLET, FILM COATED ORAL at 08:07

## 2019-12-30 RX ADMIN — INSULIN HUMAN 4 UNITS: 100 INJECTION, SOLUTION PARENTERAL at 12:19

## 2019-12-30 RX ADMIN — SENNOSIDES 17.2 MG: 8.6 TABLET, FILM COATED ORAL at 21:26

## 2019-12-30 RX ADMIN — ENOXAPARIN SODIUM 40 MG: 40 INJECTION SUBCUTANEOUS at 12:13

## 2019-12-30 RX ADMIN — INSULIN HUMAN 4 UNITS: 100 INJECTION, SOLUTION PARENTERAL at 16:45

## 2019-12-30 RX ADMIN — HYDROCODONE BITARTRATE AND ACETAMINOPHEN 1 TABLET: 7.5; 325 TABLET ORAL at 13:02

## 2019-12-30 RX ADMIN — INSULIN HUMAN 4 UNITS: 100 INJECTION, SOLUTION PARENTERAL at 22:10

## 2019-12-30 RX ADMIN — LISINOPRIL 20 MG: 20 TABLET ORAL at 08:08

## 2019-12-30 RX ADMIN — TICAGRELOR 90 MG: 90 TABLET ORAL at 22:10

## 2019-12-30 RX ADMIN — CLOPIDOGREL BISULFATE 75 MG: 75 TABLET ORAL at 08:07

## 2019-12-30 RX ADMIN — INSULIN HUMAN 6 UNITS: 100 INJECTION, SOLUTION PARENTERAL at 07:51

## 2019-12-30 NOTE — INTERDISCIPLINARY ROUNDS
Interdisciplinary team rounds were held 12/30/2019 with the following team members:  Care Management, Physical Therapy, and . Plan of care discussed. See clinical pathway and/or care plan for interventions and desired outcomes.     Anticipate discharge to [facility] pending insurance approval.

## 2019-12-30 NOTE — PROGRESS NOTES
12/29/19 1900   NIH Stroke Scale   Interval Other (comment)   LOC 0   LOC Questions 0   LOC Commands 0   Best Gaze 0   Visual 0   Facial Palsy 0   Motor Right Arm 0   Motor Left Arm 0   Motor Right Leg 0   Motor Left Leg 0   Limb Ataxia 0   Sensory 0   Best Language 0   Dysarthria 0   Extinction and Inattention 0   Total 0   DUAL NIH WITH dimple HARRIS

## 2019-12-30 NOTE — PROGRESS NOTES
Problem: Mobility Impaired (Adult and Pediatric)  Goal: *Acute Goals and Plan of Care (Insert Text)  Description  LTG:  (1.)Mr. Zoila Moody will move from supine to sit and sit to supine , scoot up and down and roll side to side in bed with MODIFIED INDEPENDENCE within 7 treatment day(s). (2.)Mr. Zoila Moody will transfer from bed to chair and chair to bed with STAND BY ASSIST using the least restrictive device within 7 treatment day(s). (3.)Mr. Zoila Moody will ambulate with CONTACT GUARD ASSIST for 80 feet with the least restrictive device within 7 treatment day(s). (4.)Mr. Zoila Moody will maintain at least GOOD static/dynamic sitting balance for improved safety within 7 treatment days. (5.)Mr. Zoila Moody will perform standing static and dynamic balance activities x 15 minutes with CONTACT GUARD ASSIST to improve safety within 7 treatment day(s). ________________________________________________________________________________________________     Outcome: Progressing Towards Goal     PHYSICAL THERAPY: Daily Note and AM 12/30/2019  INPATIENT: PT Visit Days : 2  Payor: Zohaib Paul / Plan: [facility] / Product Type: PPO /       NAME/AGE/GENDER: Arsenio Archer is a 59 y.o. male   PRIMARY DIAGNOSIS: Stroke due to embolism of left [facility] [I63.112] <principal problem not specified> <principal problem not specified>       ICD-10: Treatment Diagnosis:    · Generalized Muscle Weakness (M62.81)  · Difficulty in walking, Not elsewhere classified (R26.2)   Precaution/Allergies:  Sulfur      ASSESSMENT:     The patient is supine in bed upon contact and very agreeable to PT, stating he is eager to get up. The patient is somewhat impulsive throughout treatment. The patient performs supine to sit with SBA and verbal cues for safety awareness to prevent standing too soon.   The patient scoots to the edge of the bed with SBA and performs sit to stand with min assist and verbal cues for proper hand placement on the bed instead of the walker. The patient then ambulates [de-identified]' x2 with RW, min assist, and constant verbal cues for proper walker management, posture, gait technique, and safety awareness. The patient demonstrates an ataxic gait pattern and is unsteady on his feet with fair dynamic standing balance. The patient then returns to the room and performs an uncontrolled descent into the recliner chair with min assist and verbal cues for hand placement. The patient then performed sit to stand with min assist and participated in standing exercises and balance practice. The patient then returned to the recliner chair and performed stand to sit with min assist and better control, following verbal cues for hand placement and control of descent. The patient is very quick to start moving and shows signs of being unaware of his deficits. Overall the patient is making good progress toward therapy goals as indicated by increased gait distances and decreased assistance level, but continues to have decreased functional balance and safety awareness. Will continue skilled PT treatment as patient is still below functional baseline. Per initial:  Mr. Marcel Andersen is a 59 y.o. male in the hospital for CVA workup with finding of L vertebral artery occulusion. Pt underwent mechanical endarterectomy 12/25/19 by Dr. Isha Lujan. Pt reports that he lives in a one story house with wife that has 3 steps to enter. This section established at most recent assessment   PROBLEM LIST (Impairments causing functional limitations):  1. Decreased Strength  2. Decreased Transfer Abilities  3. Decreased Ambulation Ability/Technique  4. Decreased Balance  5. Decreased Activity Tolerance  6. Increased Fatigue   INTERVENTIONS PLANNED: (Benefits and precautions of physical therapy have been discussed with the patient.)  1. Balance Exercise  2. Bed Mobility  3. Family Education  4. Gait Training  5.  Home Exercise Program (HEP)  6. Neuromuscular Re-education/Strengthening  7. Therapeutic Activites  8. Therapeutic Exercise/Strengthening  9. Transfer Training     TREATMENT PLAN: Frequency/Duration: 3 times a week for duration of hospital stay  Rehabilitation Potential For Stated Goals: Good     REHAB RECOMMENDATIONS (at time of discharge pending progress):    Placement: It is my opinion, based on this patient's performance to date, that Mr. Marcelle Bassett may benefit from intensive therapy at an [address] [place] [address] after discharge due to potential to make ongoing and sustainable functional improvement that is of practical value. .  Equipment:    None at this time              HISTORY:   History of Present Injury/Illness (Reason for Referral):  CVA  Past Medical History/Comorbidities:   Mr. Marcelle Bassett  has a past medical history of Diabetes ([address] Hypertension, and MI (myocardial infarction) ([facility] [address] (2002). Mr. Marcelle Bassett  has a past surgical history that includes hx coronary stent placement (2002) and ir perc art mech thromb infusion intracranial (12/25/2019). Social History/Living Environment:   Home Environment: Private residence  # Steps to Enter: 3  Rails to Enter: No  One/Two Story Residence: One story  Living Alone: No  Support Systems: Family member(s), Spouse/Significant Other/Partner  Patient Expects to be Discharged to[de-identified] Unknown  Current DME Used/Available at Home: None  Tub or Shower Type: Shower  Prior Level of Function/Work/Activity:  Lives with wife and independent PTA. Works full time. Number of Personal Factors/Comorbidities that affect the Plan of Care: 1-2: MODERATE COMPLEXITY   EXAMINATION:   Most Recent Physical Functioning:   Gross Assessment:                  Posture:     Balance:  Sitting: Impaired  Sitting - Static: Good (unsupported)  Sitting - Dynamic: Fair (occasional); Good (unsupported)  Standing: Impaired  Standing - Static: Fair;Good  Standing - Dynamic : Fair;Constant support Bed Mobility:  Supine to Sit: Stand-by assistance  Scooting: Stand-by assistance  Wheelchair Mobility:     Transfers:  Sit to Stand: Contact guard assistance;Minimum assistance  Stand to Sit: Contact guard assistance;Minimum assistance  Gait:     Base of Support: Center of gravity altered;Narrowed  Gait Abnormalities: Ataxic;Decreased step clearance; Path deviations;Trunk sway increased  Distance (ft): 80 Feet (ft)(x2)  Assistive Device: Walker, rolling;Gait belt  Ambulation - Level of Assistance: Minimal assistance      Body Structures Involved:  1. Nerves  2. Muscles Body Functions Affected:  1. Sensory/Pain  2. Neuromusculoskeletal  3. Movement Related Activities and Participation Affected:  1. General Tasks and Demands  2. Communication  3. Mobility  4. Self Care  5. Domestic Life  6. Community, Social and [place]   Number of elements that affect the Plan of Care: 4+: HIGH COMPLEXITY   CLINICAL PRESENTATION:   Presentation: Evolving clinical presentation with changing clinical characteristics: MODERATE COMPLEXITY   CLINICAL DECISION MAKIN [facility] Mobility Inpatient Short Form  How much difficulty does the patient currently have. .. Unable A Lot A Little None   1. Turning over in bed (including adjusting bedclothes, sheets and blankets)? [] 1   [] 2   [] 3   [x] 4   2. Sitting down on and standing up from a chair with arms ( e.g., wheelchair, bedside commode, etc.)   [] 1   [] 2   [x] 3   [] 4   3. Moving from lying on back to sitting on the side of the bed? [] 1   [] 2   [x] 3   [] 4   How much help from another person does the patient currently need. .. Total A Lot A Little None   4. Moving to and from a bed to a chair (including a wheelchair)? [] 1   [x] 2   [] 3   [] 4   5. Need to walk in hospital room? [] 1   [x] 2   [] 3   [] 4   6. Climbing 3-5 steps with a railing? [] 1   [x] 2   [] 3   [] 4   © , Trustees of [address] [place] [address] [address] [address], under license to [employer].  All rights reserved      Score:  Initial: 16 Most Recent: X (Date: -- )    Interpretation of Tool:  Represents activities that are increasingly more difficult (i.e. Bed mobility, Transfers, Gait). Medical Necessity:     · Patient demonstrates   · good  ·  rehab potential due to higher previous functional level. Reason for Services/Other Comments:  · Patient continues to require skilled intervention due to   · Decreased functional mobility and balance   · . Use of outcome tool(s) and clinical judgement create a POC that gives a: Questionable prediction of patient's progress: MODERATE COMPLEXITY            TREATMENT:   (In addition to Assessment/Re-Assessment sessions the following treatments were rendered)   Pre-treatment Symptoms/Complaints:  \"I want to get up\"  Pain: Initial:   Pain Intensity 1: 0  Post Session:  0         Gait Training (  10 minutes):  Gait training to improve B LE coordination and proprioception. Ambulated 80 Feet (ft)(x2) with Minimal assistance using a Walker, rolling;Gait belt with moderate cuing for safety. Therapeutic Exercise: (  15 minutes):  Exercises per grid below to improve mobility, strength and balance. Required moderate verbal cues to promote proper body alignment, promote proper body posture and promote proper body mechanics. Progressed repetitions as indicated.      Date:  12/30/19 Date:   Date:     Activity/Exercise Parameters Parameters Parameters   Calf raises x10     Standing abduction x10     Standing marching x10     Standing SLR x10     Tandem balance x2 min B     NBOS stance x2 min                    Braces/Orthotics/Lines/Etc:   · O2 Device: Room air  Treatment/Session Assessment:    · Response to Treatment:  See above  · Interdisciplinary Collaboration:   o Physical Therapy Assistant  o Registered Nurse  · After treatment position/precautions:   o Up in chair  o Bed/Chair-wheels locked  o Bed in low position  o Call light within reach  o RN notified · Compliance with Program/Exercises: Will assess as treatment progresses  · Recommendations/Intent for next treatment session: \"Next visit will focus on advancements to more challenging activities and reduction in assistance provided\".   Total Treatment Duration:  PT Patient Time In/Time Out  Time In: 0900  Time Out: [facility]

## 2019-12-30 NOTE — PROGRESS NOTES
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 Hopefully we will hear back this afternoon and pt can transfer to [facility] later today or in a.m. Still requiring close cga and min assist with mobility due to ataxia and balance deficits. pts best chance of returning to work will require a more aggressive rehab approach than [facility] or [facility] can provide.   -cont Brilinta/Lipitor/ASA. BP controlled with Zestril  History/Subjective/Complaint:     Patient seen and examined. Records reviewed. Feeling well. Still feels unsteady but improved.      Pain 1  Pain Scale 1: Numeric (0 - 10) (12/30/19 0900)  Pain Intensity 1: 0 (12/30/19 0900)  Patient Stated Pain Goal: 0 (12/28/19 0000)  Pain Reassessment 1: Yes (12/28/19 0000)     Objective:     Vitals:  Patient Vitals for the past 8 hrs:   BP Temp Pulse Resp SpO2   12/30/19 0800 143/77 98 °F (36.7 °C) 78 18 97 %   12/30/19 0400 118/77 97.9 °F (36.6 °C) 61 19 95 %      Intake and Output:  12/28 1901 - 12/30 0700  In: -   Out: 400 [Urine:400]    Allergies   Allergen Reactions    Sulfur Unknown (comments)     Current Facility-Administered Medications   Medication Dose Route Frequency    ticagrelor (BRILINTA) tablet 90 mg  90 mg Oral Q12H    [START ON 12/31/2019] aspirin chewable tablet 81 mg  81 mg Oral DAILY    insulin regular (NOVOLIN R, HUMULIN R) injection   SubCUTAneous AC&HS    0.9% sodium chloride infusion  75 mL/hr IntraVENous CONTINUOUS    enoxaparin (LOVENOX) injection 40 mg  40 mg SubCUTAneous Q24H    senna (SENOKOT) tablet 17.2 mg  2 Tab Oral QHS    promethazine (PHENERGAN) with saline injection 6.25 mg  6.25 mg IntraVENous Q6H PRN    acetaminophen (TYLENOL) tablet 650 mg  650 mg Oral Q4H PRN    NUTRITIONAL SUPPORT ELECTROLYTE PRN ORDERS   Does Not Apply PRN    ondansetron (ZOFRAN) injection 4 mg  4 mg IntraVENous Q6H PRN    atorvastatin (LIPITOR) tablet 40 mg  40 mg Oral DAILY    lisinopril (PRINIVIL, ZESTRIL) tablet 20 mg  20 mg Oral DAILY    HYDROcodone-acetaminophen (NORCO) 7.5-325 mg per tablet 1 Tab  1 Tab Oral Q4H PRN       Physical Exam:  Awake, alert, oriented x 3  Impulsive. Fair insight/self awareness of deficits  Speech clear, fluent and appropriate in conversation  Strength near symm, mild RUE drift , CN 5 right palsy improving. cv rrr no m   LUNGS cta b  ABD soft ntnd bs +  EXT no edema    Functional Assessment:  Gross Assessment  AROM: Within functional limits (12/26/19 1300)  PROM: Within functional limits (12/26/19 1300)  Strength: Generally decreased, functional(B hip flexors 4/5) (12/26/19 1300)  Coordination: Generally decreased, functional (12/26/19 1300)  Tone: Normal (12/26/19 1300)  Sensation: Intact (12/26/19 1300)     Gait  Base of Support: Center of gravity altered;Narrowed (12/30/19 0900)  Step Length: Right shortened;Left shortened (12/26/19 1300)  Gait Abnormalities: Ataxic;Decreased step clearance; Path deviations;Trunk sway increased (12/30/19 0900)  Ambulation - Level of Assistance: Minimal assistance (12/30/19 0900)  Distance (ft): 80 Feet (ft)(x2) (12/30/19 0900)  Assistive Device: Yoav Milligan, rolling;Gait belt (12/30/19 0900)     Bed Mobility  Supine to Sit: Stand-by assistance (12/30/19 0900)  Scooting: Stand-by assistance (12/30/19 0900)     Balance  Sitting: Impaired (12/30/19 0900)  Sitting - Static: Good (unsupported) (12/30/19 0900)  Sitting - Dynamic: Fair (occasional); Good (unsupported) (12/30/19 0900)  Standing: Impaired (12/30/19 0900)  Standing - Static: Fair;Good (12/30/19 0900)  Standing - Dynamic : Fair;Constant support (12/30/19 0900)                       Bed/Mat Mobility  Supine to Sit: Stand-by assistance (12/30/19 0900)  Sit to Stand: Contact guard assistance;Minimum assistance (12/30/19 0900)  Stand to Sit: Contact guard assistance;Minimum assistance (12/30/19 0900)  Bed to Chair: Moderate assistance;Assist x2 (12/26/19 1300)  Scooting: Stand-by assistance (12/30/19 0900)     Labs/Studies:  Recent Results (from the past 72 hour(s))   GLUCOSE, POC    Collection Time: 12/27/19  4:27 PM   Result Value Ref Range    Glucose (POC) 271 (H) 65 - 100 mg/dL   GLUCOSE, POC    Collection Time: 12/27/19 10:34 PM   Result Value Ref Range    Glucose (POC) 274 (H) 65 - 100 mg/dL   CBC W/O DIFF    Collection Time: 12/28/19  5:07 AM   Result Value Ref Range    WBC 5.6 4.3 - 11.1 K/uL    RBC 4.78 4.23 - 5.6 M/uL    HGB 14.4 13.6 - 17.2 g/dL    HCT 43.2 41.1 - 50.3 %    MCV 90.4 79.6 - 97.8 FL    MCH 30.1 26.1 - 32.9 PG    MCHC 33.3 31.4 - 35.0 g/dL    RDW 13.2 11.9 - 14.6 %    PLATELET 917 490 - 465 K/uL    MPV 9.7 9.4 - 12.3 FL    ABSOLUTE NRBC 0.00 0.0 - 0.2 K/uL   MAGNESIUM    Collection Time: 12/28/19  5:07 AM   Result Value Ref Range    Magnesium 2.2 1.8 - 2.4 mg/dL   METABOLIC PANEL, BASIC    Collection Time: 12/28/19  5:07 AM   Result Value Ref Range    Sodium 144 136 - 145 mmol/L    Potassium 4.0 3.5 - 5.1 mmol/L    Chloride 112 (H) 98 - 107 mmol/L    CO2 28 21 - 32 mmol/L    Anion gap 4 (L) 7 - 16 mmol/L    Glucose 136 (H) 65 - 100 mg/dL    BUN 16 8 - 23 MG/DL    Creatinine 0.97 0.8 - 1.5 MG/DL    GFR est AA >60 >60 ml/min/1.73m2    GFR est non-AA >60 >60 ml/min/1.73m2    Calcium 8.8 8.3 - 10.4 MG/DL   GLUCOSE, POC    Collection Time: 12/28/19  7:12 AM   Result Value Ref Range    Glucose (POC) 139 (H) 65 - 100 mg/dL   GLUCOSE, POC    Collection Time: 12/28/19 10:37 AM   Result Value Ref Range    Glucose (POC) 191 (H) 65 - 100 mg/dL   GLUCOSE, POC    Collection Time: 12/28/19  4:00 PM   Result Value Ref Range    Glucose (POC) 206 (H) 65 - 100 mg/dL   GLUCOSE, POC    Collection Time: 12/28/19  9:52 PM   Result Value Ref Range    Glucose (POC) 208 (H) 65 - 100 mg/dL   MAGNESIUM    Collection Time: 12/29/19  5:51 AM   Result Value Ref Range    Magnesium 2.2 1.8 - 2.4 mg/dL   METABOLIC PANEL, BASIC    Collection Time: 12/29/19  5:51 AM   Result Value Ref Range    Sodium 143 136 - 145 mmol/L    Potassium 3.9 3.5 - 5.1 mmol/L    Chloride 109 (H) 98 - 107 mmol/L    CO2 27 21 - 32 mmol/L    Anion gap 7 7 - 16 mmol/L    Glucose 181 (H) 65 - 100 mg/dL    BUN 14 8 - 23 MG/DL    Creatinine 0.90 0.8 - 1.5 MG/DL    GFR est AA >60 >60 ml/min/1.73m2    GFR est non-AA >60 >60 ml/min/1.73m2    Calcium 8.7 8.3 - 10.4 MG/DL   GLUCOSE, POC    Collection Time: 12/29/19  7:39 AM   Result Value Ref Range    Glucose (POC) 195 (H) 65 - 100 mg/dL   GLUCOSE, POC    Collection Time: 12/29/19 11:26 AM   Result Value Ref Range    Glucose (POC) 247 (H) 65 - 100 mg/dL   GLUCOSE, POC    Collection Time: 12/29/19  4:26 PM   Result Value Ref Range    Glucose (POC) 163 (H) 65 - 100 mg/dL   GLUCOSE, POC    Collection Time: 12/29/19  9:28 PM   Result Value Ref Range    Glucose (POC) 195 (H) 65 - 100 mg/dL   MAGNESIUM    Collection Time: 12/30/19  6:59 AM   Result Value Ref Range    Magnesium 2.0 1.8 - 2.4 mg/dL   GLUCOSE, POC    Collection Time: 12/30/19  7:40 AM   Result Value Ref Range    Glucose (POC) 256 (H) 65 - 100 mg/dL        Assessment:     Principal Problem:    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

## 2019-12-30 NOTE — PROGRESS NOTES
Progress Note    Patient: Moni Hall MRN: [id number]  SSN: [id number]    YOB: 1955  Age: 59 y.o. Sex: male      Admit Date: 12/25/2019    LOS: 5 days     Subjective:    No acute changes    Current Facility-Administered Medications   Medication Dose Route Frequency    ticagrelor (BRILINTA) tablet 90 mg  90 mg Oral Q12H    [START ON 12/31/2019] aspirin chewable tablet 81 mg  81 mg Oral DAILY    insulin regular (NOVOLIN R, HUMULIN R) injection   SubCUTAneous AC&HS    0.9% sodium chloride infusion  75 mL/hr IntraVENous CONTINUOUS    enoxaparin (LOVENOX) injection 40 mg  40 mg SubCUTAneous Q24H    senna (SENOKOT) tablet 17.2 mg  2 Tab Oral QHS    promethazine (PHENERGAN) with saline injection 6.25 mg  6.25 mg IntraVENous Q6H PRN    acetaminophen (TYLENOL) tablet 650 mg  650 mg Oral Q4H PRN    NUTRITIONAL SUPPORT ELECTROLYTE PRN ORDERS   Does Not Apply PRN    ondansetron (ZOFRAN) injection 4 mg  4 mg IntraVENous Q6H PRN    atorvastatin (LIPITOR) tablet 40 mg  40 mg Oral DAILY    lisinopril (PRINIVIL, ZESTRIL) tablet 20 mg  20 mg Oral DAILY    HYDROcodone-acetaminophen (NORCO) 7.5-325 mg per tablet 1 Tab  1 Tab Oral Q4H PRN       Objective:     Vitals:    12/29/19 2000 12/30/19 0000 12/30/19 0400 12/30/19 0800   BP: 139/82 123/79 118/77 143/77   Pulse: 64 62 61 78   Resp: 19 19 19 18   Temp: 97.7 °F (36.5 °C) 98.3 °F (36.8 °C) 97.9 °F (36.6 °C) 98 °F (36.7 °C)   SpO2: 98% 96% 95% 97%   Weight:       Height:             Intake and Output:  Current Shift: No intake/output data recorded. Last 24 hr: 12/29 0701 - 12/30 0700  In: -   Out: 400 [Urine:400]    Physical Exam:   General:  Alert, cooperative, no distress, appears stated age. Eyes:  PERRL +3, R 6th nerve improving. Neck: Supple, symmetrical, trachea midline, no adenopathy, thyroid: no enlargment/tenderness/nodules, no carotid bruit and no JVD. Lungs:   Clear to auscultation bilaterally.    Heart:  Regular rate and rhythm, S1, S2 normal, no murmur, click, rub or gallop. Abdomen:   Soft, non-tender. Bowel sounds normal. No masses,  No organomegaly. Extremities: Extremities normal, atraumatic, no cyanosis or edema. Pulses: 2+ and symmetric all extremities. Skin: Skin color, texture, turgor normal. No rashes or lesions. R groin CDI. Neurologic: AOX3, following commands. Maew this am. Speech improved. 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 Pt pending IRC admit today or in am.  2. Continue PT/OT/ST. 3. OOB daily.      Signed By: Melissa Chaney NP     December 30, 2019

## 2019-12-30 NOTE — PROGRESS NOTES
12/30/19 1834   NIH Stroke Scale   Interval Other (comment)  (dual at shift change)   LOC 0   LOC Questions 0   LOC Commands 0   Best Gaze 0   Visual 0   Facial Palsy 0   Motor Right Arm 0   Motor Left Arm 0   Motor Right Leg 0   Motor Left Leg 0   Limb Ataxia 0   Sensory 0   Best Language 0   Dysarthria 0   Extinction and Inattention 0   Total 0

## 2019-12-30 NOTE — PROGRESS NOTES
12/30/19 0706   NIH Stroke Scale   Interval Other (comment)   LOC 0   LOC Questions 0   LOC Commands 0   Best Gaze 0   Visual 0   Facial Palsy 0   Motor Right Arm 0   Motor Left Arm 0   Motor Right Leg 0   Motor Left Leg 0   Limb Ataxia 0   Sensory 0   Best Language 0   Dysarthria 0   Extinction and Inattention 0   Total 0     Dual NIH w/Nate

## 2019-12-31 LAB
ERYTHROCYTE [DISTWIDTH] IN BLOOD BY AUTOMATED COUNT: 13.2 % (ref 11.9–14.6)
GLUCOSE BLD STRIP.AUTO-MCNC: 206 MG/DL (ref 65–100)
GLUCOSE BLD STRIP.AUTO-MCNC: 210 MG/DL (ref 65–100)
GLUCOSE BLD STRIP.AUTO-MCNC: 274 MG/DL (ref 65–100)
GLUCOSE BLD STRIP.AUTO-MCNC: 331 MG/DL (ref 65–100)
HCT VFR BLD AUTO: 47.8 % (ref 41.1–50.3)
HGB BLD-MCNC: 16.5 G/DL (ref 13.6–17.2)
MCH RBC QN AUTO: 30.6 PG (ref 26.1–32.9)
MCHC RBC AUTO-ENTMCNC: 34.5 G/DL (ref 31.4–35)
MCV RBC AUTO: 88.5 FL (ref 79.6–97.8)
NRBC # BLD: 0 K/UL (ref 0–0.2)
PLATELET # BLD AUTO: 176 K/UL (ref 150–450)
PMV BLD AUTO: 10.1 FL (ref 9.4–12.3)
RBC # BLD AUTO: 5.4 M/UL (ref 4.23–5.6)
WBC # BLD AUTO: 6.1 K/UL (ref 4.3–11.1)

## 2019-12-31 PROCEDURE — 65270000029 HC RM PRIVATE

## 2019-12-31 PROCEDURE — 74011250636 HC RX REV CODE- 250/636: Performed by: NURSE PRACTITIONER

## 2019-12-31 PROCEDURE — 97530 THERAPEUTIC ACTIVITIES: CPT

## 2019-12-31 PROCEDURE — 82962 GLUCOSE BLOOD TEST: CPT

## 2019-12-31 PROCEDURE — 74011250637 HC RX REV CODE- 250/637: Performed by: NURSE PRACTITIONER

## 2019-12-31 PROCEDURE — 92526 ORAL FUNCTION THERAPY: CPT

## 2019-12-31 PROCEDURE — 85027 COMPLETE CBC AUTOMATED: CPT

## 2019-12-31 PROCEDURE — 99231 SBSQ HOSP IP/OBS SF/LOW 25: CPT | Performed by: NURSE PRACTITIONER

## 2019-12-31 PROCEDURE — 36415 COLL VENOUS BLD VENIPUNCTURE: CPT

## 2019-12-31 PROCEDURE — 74011636637 HC RX REV CODE- 636/637: Performed by: NURSE PRACTITIONER

## 2019-12-31 RX ORDER — METFORMIN HYDROCHLORIDE 500 MG/1
500 TABLET ORAL 2 TIMES DAILY WITH MEALS
Status: DISCONTINUED | OUTPATIENT
Start: 2019-12-31 | End: 2020-01-02 | Stop reason: HOSPADM

## 2019-12-31 RX ADMIN — LISINOPRIL 20 MG: 20 TABLET ORAL at 07:58

## 2019-12-31 RX ADMIN — INSULIN HUMAN 4 UNITS: 100 INJECTION, SOLUTION PARENTERAL at 16:24

## 2019-12-31 RX ADMIN — INSULIN HUMAN 4 UNITS: 100 INJECTION, SOLUTION PARENTERAL at 11:51

## 2019-12-31 RX ADMIN — METFORMIN HYDROCHLORIDE 500 MG: 500 TABLET ORAL at 07:58

## 2019-12-31 RX ADMIN — SENNOSIDES 17.2 MG: 8.6 TABLET, FILM COATED ORAL at 21:50

## 2019-12-31 RX ADMIN — METFORMIN HYDROCHLORIDE 500 MG: 500 TABLET ORAL at 16:20

## 2019-12-31 RX ADMIN — ASPIRIN 81 MG 81 MG: 81 TABLET ORAL at 07:58

## 2019-12-31 RX ADMIN — INSULIN HUMAN 6 UNITS: 100 INJECTION, SOLUTION PARENTERAL at 21:49

## 2019-12-31 RX ADMIN — INSULIN HUMAN 8 UNITS: 100 INJECTION, SOLUTION PARENTERAL at 07:59

## 2019-12-31 RX ADMIN — ENOXAPARIN SODIUM 40 MG: 40 INJECTION SUBCUTANEOUS at 11:51

## 2019-12-31 RX ADMIN — TICAGRELOR 90 MG: 90 TABLET ORAL at 11:51

## 2019-12-31 RX ADMIN — TICAGRELOR 90 MG: 90 TABLET ORAL at 21:54

## 2019-12-31 RX ADMIN — ATORVASTATIN CALCIUM 40 MG: 40 TABLET, FILM COATED ORAL at 07:58

## 2019-12-31 NOTE — PROGRESS NOTES
Attempted to see patient. Patient with dinner tray and completed meal with no oropharyngeal dysphagia noted. Patient had a room full of visitors so deferred and further treatment at this time.     Mariaa Hurley, SLP

## 2019-12-31 NOTE — PROGRESS NOTES
Problem: Diabetes Self-Management  Goal: *Disease process and treatment process  Description  Define diabetes and identify own type of diabetes; list 3 options for treating diabetes. Outcome: Progressing Towards Goal  Goal: *Incorporating nutritional management into lifestyle  Description  Describe effect of type, amount and timing of food on blood glucose; list 3 methods for planning meals. Outcome: Progressing Towards Goal  Goal: *Incorporating physical activity into lifestyle  Description  State effect of exercise on blood glucose levels. Outcome: Progressing Towards Goal  Goal: *Developing strategies to promote health/change behavior  Description  Define the ABC's of diabetes; identify appropriate screenings, schedule and personal plan for screenings. Outcome: Progressing Towards Goal  Goal: *Using medications safely  Description  State effect of diabetes medications on diabetes; name diabetes medication taking, action and side effects. Outcome: Progressing Towards Goal  Goal: *Monitoring blood glucose, interpreting and using results  Description  Identify recommended blood glucose targets  and personal targets. Outcome: Progressing Towards Goal  Goal: *Prevention, detection, treatment of acute complications  Description  List symptoms of hyper- and hypoglycemia; describe how to treat low blood sugar and actions for lowering  high blood glucose level. Outcome: Progressing Towards Goal  Goal: *Prevention, detection and treatment of chronic complications  Description  Define the natural course of diabetes and describe the relationship of blood glucose levels to long term complications of diabetes.   Outcome: Progressing Towards Goal  Goal: *Developing strategies to address psychosocial issues  Description  Describe feelings about living with diabetes; identify support needed and support network  Outcome: Progressing Towards Goal  Goal: *Sick day guidelines  Outcome: Progressing Towards Goal  Goal: *Patient Specific Goal (EDIT GOAL, INSERT TEXT)  Outcome: Progressing Towards Goal     Problem: Patient Education: Go to Patient Education Activity  Goal: Patient/Family Education  Outcome: Progressing Towards Goal     Problem: Falls - Risk of  Goal: *Absence of Falls  Description  Document Shereen Chawla Fall Risk and appropriate interventions in the flowsheet. Outcome: Progressing Towards Goal  Note: Fall Risk Interventions:  Mobility Interventions: Communicate number of staff needed for ambulation/transfer, OT consult for ADLs, PT Consult for mobility concerns, Patient to call before getting OOB         Medication Interventions: Patient to call before getting OOB, Teach patient to arise slowly    Elimination Interventions: Call light in reach, Patient to call for help with toileting needs, Stay With Me (per policy), Toilet paper/wipes in reach, Toileting schedule/hourly rounds              Problem: Patient Education: Go to Patient Education Activity  Goal: Patient/Family Education  Outcome: Progressing Towards Goal     Problem: Pressure Injury - Risk of  Goal: *Prevention of pressure injury  Description  Document Taiwo Scale and appropriate interventions in the flowsheet.   Outcome: Progressing Towards Goal  Note: Pressure Injury Interventions:  Sensory Interventions: Assess changes in LOC    Moisture Interventions: Absorbent underpads    Activity Interventions: Pressure redistribution bed/mattress(bed type), Increase time out of bed, PT/OT evaluation    Mobility Interventions: HOB 30 degrees or less, Pressure redistribution bed/mattress (bed type), PT/OT evaluation    Nutrition Interventions: Document food/fluid/supplement intake    Friction and Shear Interventions: HOB 30 degrees or less, Minimize layers                Problem: Patient Education: Go to Patient Education Activity  Goal: Patient/Family Education  Outcome: Progressing Towards Goal     Problem: Patient Education: Go to Patient Education Activity  Goal: Patient/Family Education  Outcome: Progressing Towards Goal     Problem: TIA/CVA Stroke: Day 2 Until Discharge  Goal: Off Pathway (Use only if patient is Off Pathway)  Outcome: Progressing Towards Goal  Goal: Activity/Safety  Outcome: Progressing Towards Goal  Goal: Diagnostic Test/Procedures  Outcome: Progressing Towards Goal  Goal: Nutrition/Diet  Outcome: Progressing Towards Goal  Goal: Discharge Planning  Outcome: Progressing Towards Goal  Goal: Medications  Outcome: Progressing Towards Goal  Goal: Respiratory  Outcome: Progressing Towards Goal  Goal: Treatments/Interventions/Procedures  Outcome: Progressing Towards Goal  Goal: Psychosocial  Outcome: Progressing Towards Goal  Goal: *Verbalizes anxiety and depression are reduced or absent  Outcome: Progressing Towards Goal  Goal: *Absence of aspiration  Outcome: Progressing Towards Goal  Goal: *Absence of deep venous thrombosis signs and symptoms(Stroke Metric)  Outcome: Progressing Towards Goal  Goal: *Optimal pain control at patient's stated goal  Outcome: Progressing Towards Goal  Goal: *Tolerating diet  Outcome: Progressing Towards Goal  Goal: *Ability to perform ADLs and demonstrates progressive mobility and function  Outcome: Progressing Towards Goal  Goal: *Stroke education continued(Stroke Metric)  Outcome: Progressing Towards Goal     Problem: Patient Education: Go to Patient Education Activity  Goal: Patient/Family Education  Outcome: Progressing Towards Goal     Problem: Patient Education: Go to Patient Education Activity  Goal: Patient/Family Education  Outcome: Progressing Towards Goal

## 2019-12-31 NOTE — PROGRESS NOTES
Progress Note    Patient: Donita Modi MRN: [id number]  SSN: [id number]    YOB: 1955  Age: 59 y.o. Sex: male      Admit Date: 12/25/2019    LOS: 6 days     Subjective:    No acute changes    Current Facility-Administered Medications   Medication Dose Route Frequency    metFORMIN (GLUCOPHAGE) tablet 500 mg  500 mg Oral BID WITH MEALS    ticagrelor (BRILINTA) tablet 90 mg  90 mg Oral Q12H    aspirin chewable tablet 81 mg  81 mg Oral DAILY    insulin regular (NOVOLIN R, HUMULIN R) injection   SubCUTAneous AC&HS    0.9% sodium chloride infusion  75 mL/hr IntraVENous CONTINUOUS    enoxaparin (LOVENOX) injection 40 mg  40 mg SubCUTAneous Q24H    senna (SENOKOT) tablet 17.2 mg  2 Tab Oral QHS    promethazine (PHENERGAN) with saline injection 6.25 mg  6.25 mg IntraVENous Q6H PRN    acetaminophen (TYLENOL) tablet 650 mg  650 mg Oral Q4H PRN    ondansetron (ZOFRAN) injection 4 mg  4 mg IntraVENous Q6H PRN    atorvastatin (LIPITOR) tablet 40 mg  40 mg Oral DAILY    lisinopril (PRINIVIL, ZESTRIL) tablet 20 mg  20 mg Oral DAILY    HYDROcodone-acetaminophen (NORCO) 7.5-325 mg per tablet 1 Tab  1 Tab Oral Q4H PRN       Objective:     Vitals:    12/26/19 0610 12/31/19 0000 12/31/19 0400 12/31/19 0800   BP:  129/82 130/83 157/68   Pulse:  70 67 75   Resp:  17 17 17   Temp:  97.4 °F (36.3 °C) 97.3 °F (36.3 °C) 97.6 °F (36.4 °C)   SpO2:  96% 96% 97%   Weight: 222 lb 10.6 oz (101 kg)      Height:             Intake and Output:  Current Shift: 12/31 0701 - 12/31 1900  In: 210 [P.O.:210]  Out: -   Last 24 hr: 12/30 0701 - 12/31 0700  In: -   Out: 300 [Urine:300]    Physical Exam:   General:  Alert, cooperative, no distress, appears stated age. Eyes:  PERRL +3, R 6th nerve improving. Neck: Supple, symmetrical, trachea midline, no adenopathy, thyroid: no enlargment/tenderness/nodules, no carotid bruit and no JVD. Lungs:   Clear to auscultation bilaterally.    Heart:  Regular rate and rhythm, S1, S2 normal, no murmur, click, rub or gallop. Abdomen:   Soft, non-tender. Bowel sounds normal. No masses,  No organomegaly. Extremities: Extremities normal, atraumatic, no cyanosis or edema. Pulses: 2+ and symmetric all extremities. Skin: Skin color, texture, turgor normal. No rashes or lesions. R groin CDI. Neurologic: AOX3, following commands. Maew this am. Speech improved. Lab/Data Review:    Recent Results (from the past 12 hour(s))   CBC W/O DIFF    Collection Time: 12/31/19  6:47 AM   Result Value Ref Range    WBC 6.1 4.3 - 11.1 K/uL    RBC 5.40 4.23 - 5.6 M/uL    HGB 16.5 13.6 - 17.2 g/dL    HCT 47.8 41.1 - 50.3 %    MCV 88.5 79.6 - 97.8 FL    MCH 30.6 26.1 - 32.9 PG    MCHC 34.5 31.4 - 35.0 g/dL    RDW 13.2 11.9 - 14.6 %    PLATELET 006 929 - 478 K/uL    MPV 10.1 9.4 - 12.3 FL    ABSOLUTE NRBC 0.00 0.0 - 0.2 K/uL   GLUCOSE, POC    Collection Time: 12/31/19  7:54 AM   Result Value Ref Range    Glucose (POC) 331 (H) 65 - 100 mg/dL       Assessment/ Plan:     Principal Problem:    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 Pt pending IRC admit today or in am.  2. Continue PT/OT/ST. 3. OOB daily.      Signed By: Joe Brambila NP     December 31, 2019

## 2019-12-31 NOTE — PROGRESS NOTES
12/31/19 0707   NIH Stroke Scale   Interval Other (comment)   LOC 0   LOC Questions 0   LOC Commands 0   Best Gaze 0   Visual 0   Facial Palsy 0   Motor Right Arm 0   Motor Left Arm 0   Motor Right Leg 0   Motor Left Leg 0   Limb Ataxia 0   Sensory 0   Best Language 0   Dysarthria 0   Extinction and Inattention 0   Total 0   dual nih with tamy JOE

## 2019-12-31 NOTE — PROGRESS NOTES
12/31/19 1846   NIH Stroke Scale   Interval Other (comment)  (Dual NIH with TATYANA Chaudhry)   LOC 0   LOC Questions 0   LOC Commands 0   Best Gaze 0   Visual 0   Facial Palsy 0   Motor Right Arm 0   Motor Left Arm 0   Motor Right Leg 0   Motor Left Leg 0   Limb Ataxia 0   Sensory 0   Best Language 0   Dysarthria 0   Extinction and Inattention 0   Total 0

## 2019-12-31 NOTE — PROGRESS NOTES
Problem: Mobility Impaired (Adult and Pediatric)  Goal: *Acute Goals and Plan of Care (Insert Text)  Description  LTG:  (1.)Mr. Pham Poe will move from supine to sit and sit to supine , scoot up and down and roll side to side in bed with MODIFIED INDEPENDENCE within 7 treatment day(s). (2.)Mr. Pham Poe will transfer from bed to chair and chair to bed with STAND BY ASSIST using the least restrictive device within 7 treatment day(s). (3.)Mr. Pham Poe will ambulate with CONTACT GUARD ASSIST for 80 feet with the least restrictive device within 7 treatment day(s). (4.)Mr. Pham Poe will maintain at least GOOD static/dynamic sitting balance for improved safety within 7 treatment days. (5.)Mr. Pham Poe will perform standing static and dynamic balance activities x 15 minutes with CONTACT GUARD ASSIST to improve safety within 7 treatment day(s). ________________________________________________________________________________________________     Outcome: Progressing Towards Goal     PHYSICAL THERAPY: Daily Note and PM 12/31/2019  INPATIENT: PT Visit Days : 3  Payor: Jose L Garcia / Plan: [facility] / Product Type: PPO /       NAME/AGE/GENDER: Anand Sampson is a 59 y.o. male   PRIMARY DIAGNOSIS: Stroke due to embolism of left vertebral artery ([facility] [I63.112] Stroke due to embolism of left vertebral artery (HCC) Stroke due to embolism of left vertebral artery (HCC)       ICD-10: Treatment Diagnosis:    · Generalized Muscle Weakness (M62.81)  · Difficulty in walking, Not elsewhere classified (R26.2)   Precaution/Allergies:  Sulfur      ASSESSMENT:     The patient is supine in bed upon contact and very agreeable to PT, stating he is eager to get up. The patient is somewhat impulsive throughout treatment. The patient performs supine to sit with supervision.    The patient scoots to the edge of the bed with supervision and performs sit to stand with CGA and verbal cues for proper hand placement on the bed instead of the walker. The patient then ambulates [de-identified]' x2 with RW, CGA and verbal cues for walker management and safety awareness. The patient is much mor steady than yesterday, but still demonstrates a narrow gait with increased trunk sway. The patient returns to the edge of the bed and performs stand to sit with CGA. After a seated rest break, the patient ambulates 20' with no AD and min assist for balance. The patient then returns to the bed and sits with CGA. Overall the patient is making good progress toward therapy goals as indicated by increased gait distances and decreased assistance level, but continues to have decreased functional balance and safety awareness. Will continue skilled PT treatment as patient is still below functional baseline. Per initial:  Mr. Geronimo Jason is a 59 y.o. male in the hospital for CVA workup with finding of L vertebral artery occulusion. Pt underwent mechanical endarterectomy 12/25/19 by Dr. Jessica Billingsley. Pt reports that he lives in a one story house with wife that has 3 steps to enter. This section established at most recent assessment   PROBLEM LIST (Impairments causing functional limitations):  1. Decreased Strength  2. Decreased Transfer Abilities  3. Decreased Ambulation Ability/Technique  4. Decreased Balance  5. Decreased Activity Tolerance  6. Increased Fatigue   INTERVENTIONS PLANNED: (Benefits and precautions of physical therapy have been discussed with the patient.)  1. Balance Exercise  2. Bed Mobility  3. Family Education  4. Gait Training  5. Home Exercise Program (HEP)  6. Neuromuscular Re-education/Strengthening  7. Therapeutic Activites  8. Therapeutic Exercise/Strengthening  9. Transfer Training     TREATMENT PLAN: Frequency/Duration: 3 times a week for duration of hospital stay  Rehabilitation Potential For Stated Goals: Good     REHAB RECOMMENDATIONS (at time of discharge pending progress):    Placement:   It is my opinion, based on this patient's performance to date, that Mr. Mechelle Jalloh may benefit from intensive therapy at an [address] [place] [address] after discharge due to potential to make ongoing and sustainable functional improvement that is of practical value. .  Equipment:    None at this time              HISTORY:   History of Present Injury/Illness (Reason for Referral):  CVA  Past Medical History/Comorbidities:   Mr. Mechelle Jalloh  has a past medical history of Diabetes ([facility] Hypertension, and MI (myocardial infarction) ([facility] (2002). Mr. Mechelle Jalloh  has a past surgical history that includes hx coronary stent placement (2002) and ir perc art mech thromb infusion intracranial (12/25/2019). Social History/Living Environment:   Home Environment: Private residence  # Steps to Enter: 3  Rails to Enter: No  One/Two Story Residence: One story  Living Alone: No  Support Systems: Family member(s), Spouse/Significant Other/Partner  Patient Expects to be Discharged to[de-identified] Unknown  Current DME Used/Available at Home: None  Tub or Shower Type: Shower  Prior Level of Function/Work/Activity:  Lives with wife and independent PTA. Works full time.      Number of Personal Factors/Comorbidities that affect the Plan of Care: 1-2: MODERATE COMPLEXITY   EXAMINATION:   Most Recent Physical Functioning:   Gross Assessment:                  Posture:     Balance:  Sitting: Intact  Standing: Impaired  Standing - Static: Fair;Good;Constant support  Standing - Dynamic : Fair;Good;Constant support Bed Mobility:  Supine to Sit: Supervision  Scooting: Supervision  Wheelchair Mobility:     Transfers:  Sit to Stand: Contact guard assistance  Stand to Sit: Contact guard assistance  Gait:     Base of Support: Center of gravity altered;Narrowed  Step Length: Right shortened;Left shortened  Gait Abnormalities: Decreased step clearance  Distance (ft): 80 Feet (ft)(x3, and 20' no AD)  Assistive Device: Walker, rolling  Ambulation - Level of Assistance: Minimal assistance;Contact guard assistance      Body Structures Involved:  1. Nerves  2. Muscles Body Functions Affected:  1. Sensory/Pain  2. Neuromusculoskeletal  3. Movement Related Activities and Participation Affected:  1. General Tasks and Demands  2. Communication  3. Mobility  4. Self Care  5. Domestic Life  6. Community, Social and [place]   Number of elements that affect the Plan of Care: 4+: HIGH COMPLEXITY   CLINICAL PRESENTATION:   Presentation: Evolving clinical presentation with changing clinical characteristics: MODERATE COMPLEXITY   CLINICAL DECISION MAKIN [facility] Inpatient Short Form  How much difficulty does the patient currently have. .. Unable A Lot A Little None   1. Turning over in bed (including adjusting bedclothes, sheets and blankets)? [] 1   [] 2   [] 3   [x] 4   2. Sitting down on and standing up from a chair with arms ( e.g., wheelchair, bedside commode, etc.)   [] 1   [] 2   [x] 3   [] 4   3. Moving from lying on back to sitting on the side of the bed? [] 1   [] 2   [x] 3   [] 4   How much help from another person does the patient currently need. .. Total A Lot A Little None   4. Moving to and from a bed to a chair (including a wheelchair)? [] 1   [x] 2   [] 3   [] 4   5. Need to walk in hospital room? [] 1   [x] 2   [] 3   [] 4   6. Climbing 3-5 steps with a railing? [] 1   [x] 2   [] 3   [] 4   © , Trustees of [address] [place] [address], under license to [employer]. All rights reserved      Score:  Initial: 16 Most Recent: X (Date: -- )    Interpretation of Tool:  Represents activities that are increasingly more difficult (i.e. Bed mobility, Transfers, Gait). Medical Necessity:     · Patient demonstrates   · good  ·  rehab potential due to higher previous functional level. Reason for Services/Other Comments:  · Patient continues to require skilled intervention due to   · Decreased functional mobility and balance   · .    Use of outcome tool(s) and clinical judgement create a POC that gives a: Questionable prediction of patient's progress: MODERATE COMPLEXITY            TREATMENT:   (In addition to Assessment/Re-Assessment sessions the following treatments were rendered)   Pre-treatment Symptoms/Complaints:  \"I'm ready to walk\"  Pain: Initial:   Pain Intensity 1: 0  Post Session:  0       Therapeutic Activity: (    23 minutes): Therapeutic activities including Bed transfers, Chair transfers, Ambulation on level ground and static and dynamic balance to improve mobility, strength, balance and coordination. Required minimal   to promote static and dynamic balance in standing. Therapeutic Exercise: (  0 minutes):  Exercises per grid below to improve mobility, strength and balance. Required moderate verbal cues to promote proper body alignment, promote proper body posture and promote proper body mechanics. Progressed repetitions as indicated. Date:  12/30/19 Date:   Date:     Activity/Exercise Parameters Parameters Parameters   Calf raises x10     Standing abduction x10     Standing marching x10     Standing SLR x10     Tandem balance x2 min B     NBOS stance x2 min                    Braces/Orthotics/Lines/Etc:   · O2 Device: Room air  Treatment/Session Assessment:    · Response to Treatment:  See above  · Interdisciplinary Collaboration:   o Physical Therapy Assistant  o Registered Nurse  · After treatment position/precautions:   o Bed/Chair-wheels locked  o Bed in low position  o Call light within reach  o RN notified  o Family at bedside  o Seated edge of bed   · Compliance with Program/Exercises: Will assess as treatment progresses  · Recommendations/Intent for next treatment session: \"Next visit will focus on advancements to more challenging activities and reduction in assistance provided\".   Total Treatment Duration:  PT Patient Time In/Time Out  Time In: 1524  Time Out: [address] Alfred Calderon, PTA

## 2020-01-01 LAB
ERYTHROCYTE [DISTWIDTH] IN BLOOD BY AUTOMATED COUNT: 13.2 % (ref 11.9–14.6)
GLUCOSE BLD STRIP.AUTO-MCNC: 179 MG/DL (ref 65–100)
GLUCOSE BLD STRIP.AUTO-MCNC: 193 MG/DL (ref 65–100)
GLUCOSE BLD STRIP.AUTO-MCNC: 213 MG/DL (ref 65–100)
GLUCOSE BLD STRIP.AUTO-MCNC: 283 MG/DL (ref 65–100)
HCT VFR BLD AUTO: 46.4 % (ref 41.1–50.3)
HGB BLD-MCNC: 15.8 G/DL (ref 13.6–17.2)
MCH RBC QN AUTO: 29.8 PG (ref 26.1–32.9)
MCHC RBC AUTO-ENTMCNC: 34.1 G/DL (ref 31.4–35)
MCV RBC AUTO: 87.4 FL (ref 79.6–97.8)
NRBC # BLD: 0 K/UL (ref 0–0.2)
PLATELET # BLD AUTO: 180 K/UL (ref 150–450)
PMV BLD AUTO: 9.8 FL (ref 9.4–12.3)
RBC # BLD AUTO: 5.31 M/UL (ref 4.23–5.6)
WBC # BLD AUTO: 6.5 K/UL (ref 4.3–11.1)

## 2020-01-01 PROCEDURE — 99231 SBSQ HOSP IP/OBS SF/LOW 25: CPT | Performed by: NEUROLOGICAL SURGERY

## 2020-01-01 PROCEDURE — 36415 COLL VENOUS BLD VENIPUNCTURE: CPT

## 2020-01-01 PROCEDURE — 65270000029 HC RM PRIVATE

## 2020-01-01 PROCEDURE — 85027 COMPLETE CBC AUTOMATED: CPT

## 2020-01-01 PROCEDURE — 74011636637 HC RX REV CODE- 636/637: Performed by: NURSE PRACTITIONER

## 2020-01-01 PROCEDURE — 74011250636 HC RX REV CODE- 250/636: Performed by: NURSE PRACTITIONER

## 2020-01-01 PROCEDURE — 74011250637 HC RX REV CODE- 250/637: Performed by: NURSE PRACTITIONER

## 2020-01-01 PROCEDURE — 82962 GLUCOSE BLOOD TEST: CPT

## 2020-01-01 RX ADMIN — METFORMIN HYDROCHLORIDE 500 MG: 500 TABLET ORAL at 16:34

## 2020-01-01 RX ADMIN — TICAGRELOR 90 MG: 90 TABLET ORAL at 11:54

## 2020-01-01 RX ADMIN — INSULIN HUMAN 4 UNITS: 100 INJECTION, SOLUTION PARENTERAL at 22:14

## 2020-01-01 RX ADMIN — INSULIN HUMAN 6 UNITS: 100 INJECTION, SOLUTION PARENTERAL at 11:40

## 2020-01-01 RX ADMIN — TICAGRELOR 90 MG: 90 TABLET ORAL at 22:14

## 2020-01-01 RX ADMIN — ENOXAPARIN SODIUM 40 MG: 40 INJECTION SUBCUTANEOUS at 11:39

## 2020-01-01 RX ADMIN — INSULIN HUMAN 2 UNITS: 100 INJECTION, SOLUTION PARENTERAL at 16:31

## 2020-01-01 RX ADMIN — SENNOSIDES 17.2 MG: 8.6 TABLET, FILM COATED ORAL at 22:14

## 2020-01-01 RX ADMIN — METFORMIN HYDROCHLORIDE 500 MG: 500 TABLET ORAL at 08:51

## 2020-01-01 RX ADMIN — INSULIN HUMAN 2 UNITS: 100 INJECTION, SOLUTION PARENTERAL at 08:51

## 2020-01-01 RX ADMIN — LISINOPRIL 20 MG: 20 TABLET ORAL at 08:51

## 2020-01-01 RX ADMIN — ATORVASTATIN CALCIUM 40 MG: 40 TABLET, FILM COATED ORAL at 08:51

## 2020-01-01 RX ADMIN — ASPIRIN 81 MG 81 MG: 81 TABLET ORAL at 08:51

## 2020-01-01 NOTE — PROGRESS NOTES
01/01/20 0741   NIH Stroke Scale   Interval Other (comment)   LOC 0   LOC Questions 0   LOC Commands 0   Best Gaze 0   Visual 0   Facial Palsy 0   Motor Right Arm 0   Motor Left Arm 0   Motor Right Leg 0   Motor Left Leg 0   Limb Ataxia 0   Sensory 0   Best Language 0   Dysarthria 0   Extinction and Inattention 0   Total 0   Dual NIH with Addi Corrigan, RN

## 2020-01-01 NOTE — PROGRESS NOTES
Problem: Diabetes Self-Management  Goal: *Disease process and treatment process  Description  Define diabetes and identify own type of diabetes; list 3 options for treating diabetes. Outcome: Progressing Towards Goal  Goal: *Incorporating nutritional management into lifestyle  Description  Describe effect of type, amount and timing of food on blood glucose; list 3 methods for planning meals. Outcome: Progressing Towards Goal  Goal: *Incorporating physical activity into lifestyle  Description  State effect of exercise on blood glucose levels. Outcome: Progressing Towards Goal  Goal: *Developing strategies to promote health/change behavior  Description  Define the ABC's of diabetes; identify appropriate screenings, schedule and personal plan for screenings. Outcome: Progressing Towards Goal  Goal: *Using medications safely  Description  State effect of diabetes medications on diabetes; name diabetes medication taking, action and side effects. Outcome: Progressing Towards Goal  Goal: *Monitoring blood glucose, interpreting and using results  Description  Identify recommended blood glucose targets  and personal targets. Outcome: Progressing Towards Goal  Goal: *Prevention, detection, treatment of acute complications  Description  List symptoms of hyper- and hypoglycemia; describe how to treat low blood sugar and actions for lowering  high blood glucose level. Outcome: Progressing Towards Goal  Goal: *Prevention, detection and treatment of chronic complications  Description  Define the natural course of diabetes and describe the relationship of blood glucose levels to long term complications of diabetes.   Outcome: Progressing Towards Goal  Goal: *Developing strategies to address psychosocial issues  Description  Describe feelings about living with diabetes; identify support needed and support network  Outcome: Progressing Towards Goal     Problem: Patient Education: Go to Patient Education Activity  Goal: Patient/Family Education  Outcome: Progressing Towards Goal     Problem: Falls - Risk of  Goal: *Absence of Falls  Description  Document Uvaldo Richey Fall Risk and appropriate interventions in the flowsheet. Outcome: Progressing Towards Goal  Note: Fall Risk Interventions:  Mobility Interventions: Bed/chair exit alarm, Communicate number of staff needed for ambulation/transfer, OT consult for ADLs, Patient to call before getting OOB, PT Consult for mobility concerns, PT Consult for assist device competence         Medication Interventions: Teach patient to arise slowly    Elimination Interventions: Call light in reach              Problem: Patient Education: Go to Patient Education Activity  Goal: Patient/Family Education  Outcome: Progressing Towards Goal     Problem: Pressure Injury - Risk of  Goal: *Prevention of pressure injury  Description  Document Taiwo Scale and appropriate interventions in the flowsheet.   Outcome: Progressing Towards Goal  Note: Pressure Injury Interventions:  Sensory Interventions: Assess changes in LOC    Moisture Interventions: Absorbent underpads    Activity Interventions: Increase time out of bed, Pressure redistribution bed/mattress(bed type), PT/OT evaluation    Mobility Interventions: HOB 30 degrees or less, Pressure redistribution bed/mattress (bed type), PT/OT evaluation    Nutrition Interventions: Document food/fluid/supplement intake, Offer support with meals,snacks and hydration    Friction and Shear Interventions: HOB 30 degrees or less, Minimize layers                Problem: Patient Education: Go to Patient Education Activity  Goal: Patient/Family Education  Outcome: Progressing Towards Goal     Problem: Patient Education: Go to Patient Education Activity  Goal: Patient/Family Education  Outcome: Progressing Towards Goal     Problem: TIA/CVA Stroke: Day 2 Until Discharge  Goal: Activity/Safety  Outcome: Progressing Towards Goal  Goal: Diagnostic Test/Procedures  Outcome: Progressing Towards Goal  Goal: Nutrition/Diet  Outcome: Progressing Towards Goal  Goal: Discharge Planning  Outcome: Progressing Towards Goal  Goal: Medications  Outcome: Progressing Towards Goal  Goal: Respiratory  Outcome: Progressing Towards Goal  Goal: Treatments/Interventions/Procedures  Outcome: Progressing Towards Goal  Goal: Psychosocial  Outcome: Progressing Towards Goal  Goal: *Verbalizes anxiety and depression are reduced or absent  Outcome: Progressing Towards Goal  Goal: *Absence of aspiration  Outcome: Progressing Towards Goal  Goal: *Absence of deep venous thrombosis signs and symptoms(Stroke Metric)  Outcome: Progressing Towards Goal  Goal: *Optimal pain control at patient's stated goal  Outcome: Progressing Towards Goal  Goal: *Tolerating diet  Outcome: Progressing Towards Goal  Goal: *Ability to perform ADLs and demonstrates progressive mobility and function  Outcome: Progressing Towards Goal  Goal: *Stroke education continued(Stroke Metric)  Outcome: Progressing Towards Goal     Problem: Patient Education: Go to Patient Education Activity  Goal: Patient/Family Education  Outcome: Progressing Towards Goal     Problem: Patient Education: Go to Patient Education Activity  Goal: Patient/Family Education  Outcome: Progressing Towards Goal

## 2020-01-01 NOTE — PROGRESS NOTES
Progress Note    Patient: Francine Boswell MRN: [id number]  SSN: [id number]    YOB: 1955  Age: 59 y.o. Sex: male      Admit Date: 12/25/2019    LOS: 7 days     Subjective:   No acute events overnight. Current Facility-Administered Medications   Medication Dose Route Frequency    metFORMIN (GLUCOPHAGE) tablet 500 mg  500 mg Oral BID WITH MEALS    ticagrelor (BRILINTA) tablet 90 mg  90 mg Oral Q12H    aspirin chewable tablet 81 mg  81 mg Oral DAILY    insulin regular (NOVOLIN R, HUMULIN R) injection   SubCUTAneous AC&HS    0.9% sodium chloride infusion  75 mL/hr IntraVENous CONTINUOUS    enoxaparin (LOVENOX) injection 40 mg  40 mg SubCUTAneous Q24H    senna (SENOKOT) tablet 17.2 mg  2 Tab Oral QHS    promethazine (PHENERGAN) with saline injection 6.25 mg  6.25 mg IntraVENous Q6H PRN    acetaminophen (TYLENOL) tablet 650 mg  650 mg Oral Q4H PRN    ondansetron (ZOFRAN) injection 4 mg  4 mg IntraVENous Q6H PRN    atorvastatin (LIPITOR) tablet 40 mg  40 mg Oral DAILY    lisinopril (PRINIVIL, ZESTRIL) tablet 20 mg  20 mg Oral DAILY    HYDROcodone-acetaminophen (NORCO) 7.5-325 mg per tablet 1 Tab  1 Tab Oral Q4H PRN       Objective:     Vitals:    01/01/20 0000 01/01/20 0400 01/01/20 0800 01/01/20 1200   BP: 134/79 144/77 138/82 148/81   Pulse: 67 75 72 61   Resp: 17 17 18 18   Temp: 97.7 °F (36.5 °C) 98.5 °F (36.9 °C) 97.5 °F (36.4 °C) 97.9 °F (36.6 °C)   SpO2: 97% 95% 94% 94%   Weight:       Height:             Intake and Output:  Current Shift: No intake/output data recorded. Last 24 hr: 12/31 0701 - 01/01 0700  In: 210 [P.O.:210]  Out: -     Physical Exam:   General:  Alert, cooperative, no distress, appears stated age. Eyes:  Conjunctivae/corneas clear. PERRL, EOMs intact. Fundi benign   Neck: Supple, symmetrical, trachea midline, no adenopathy, thyroid: no enlargment/tenderness/nodules, no carotid bruit and no JVD. Lungs:   Clear to auscultation bilaterally.    Heart:  Regular rate and rhythm, S1, S2 normal, no murmur, click, rub or gallop. Abdomen:   Soft, non-tender. Bowel sounds normal. No masses,  No organomegaly. Extremities: Extremities normal, atraumatic, no cyanosis or edema. Pulses: 2+ and symmetric all extremities. Skin: Skin color, texture, turgor normal. No rashes or lesions   Neurologic: CNII-XII intact. Normal strength, sensation and reflexes throughout. Lab/Data Review:    Recent Results (from the past 12 hour(s))   CBC W/O DIFF    Collection Time: 01/01/20  6:02 AM   Result Value Ref Range    WBC 6.5 4.3 - 11.1 K/uL    RBC 5.31 4.23 - 5.6 M/uL    HGB 15.8 13.6 - 17.2 g/dL    HCT 46.4 41.1 - 50.3 %    MCV 87.4 79.6 - 97.8 FL    MCH 29.8 26.1 - 32.9 PG    MCHC 34.1 31.4 - 35.0 g/dL    RDW 13.2 11.9 - 14.6 %    PLATELET 304 155 - 740 K/uL    MPV 9.8 9.4 - 12.3 FL    ABSOLUTE NRBC 0.00 0.0 - 0.2 K/uL   GLUCOSE, POC    Collection Time: 01/01/20  6:41 AM   Result Value Ref Range    Glucose (POC) 179 (H) 65 - 100 mg/dL   GLUCOSE, POC    Collection Time: 01/01/20 11:03 AM   Result Value Ref Range    Glucose (POC) 283 (H) 65 - 100 mg/dL       Assessment/ Plan:     Principal Problem:    Stroke due to embolism of left vertebral artery ([facility] (12/25/2019)        1. Pt pending IRC admit. 2. Continue PT/OT/ST. 3. OOB daily.   4. Check AR and AK in am.    Signed By: Ashley Jimenez MD     January 1, 2020

## 2020-01-02 VITALS
RESPIRATION RATE: 19 BRPM | OXYGEN SATURATION: 98 % | TEMPERATURE: 97.6 F | SYSTOLIC BLOOD PRESSURE: 144 MMHG | HEART RATE: 96 BPM | BODY MASS INDEX: 29.51 KG/M2 | WEIGHT: 222.66 LBS | DIASTOLIC BLOOD PRESSURE: 86 MMHG | HEIGHT: 73 IN

## 2020-01-02 LAB
ANION GAP SERPL CALC-SCNC: 7 MMOL/L (ref 7–16)
ASPIRIN TEST, ASPIRN: 531 ARU (ref 620–672)
BUN SERPL-MCNC: 11 MG/DL (ref 8–23)
CALCIUM SERPL-MCNC: 9.4 MG/DL (ref 8.3–10.4)
CHLORIDE SERPL-SCNC: 107 MMOL/L (ref 98–107)
CO2 SERPL-SCNC: 26 MMOL/L (ref 21–32)
CREAT SERPL-MCNC: 0.88 MG/DL (ref 0.8–1.5)
ERYTHROCYTE [DISTWIDTH] IN BLOOD BY AUTOMATED COUNT: 13.2 % (ref 11.9–14.6)
GLUCOSE BLD STRIP.AUTO-MCNC: 174 MG/DL (ref 65–100)
GLUCOSE BLD STRIP.AUTO-MCNC: 226 MG/DL (ref 65–100)
GLUCOSE SERPL-MCNC: 203 MG/DL (ref 65–100)
HCT VFR BLD AUTO: 49.1 % (ref 41.1–50.3)
HGB BLD-MCNC: 16.8 G/DL (ref 13.6–17.2)
MCH RBC QN AUTO: 30.1 PG (ref 26.1–32.9)
MCHC RBC AUTO-ENTMCNC: 34.2 G/DL (ref 31.4–35)
MCV RBC AUTO: 87.8 FL (ref 79.6–97.8)
NRBC # BLD: 0 K/UL (ref 0–0.2)
P2Y12 PLT RESPONSE,PPPR: 3 PRU
PLATELET # BLD AUTO: 181 K/UL (ref 150–450)
PMV BLD AUTO: 9.7 FL (ref 9.4–12.3)
POTASSIUM SERPL-SCNC: 4 MMOL/L (ref 3.5–5.1)
RBC # BLD AUTO: 5.59 M/UL (ref 4.23–5.6)
SODIUM SERPL-SCNC: 140 MMOL/L (ref 136–145)
WBC # BLD AUTO: 6.1 K/UL (ref 4.3–11.1)

## 2020-01-02 PROCEDURE — 85576 BLOOD PLATELET AGGREGATION: CPT

## 2020-01-02 PROCEDURE — 80048 BASIC METABOLIC PNL TOTAL CA: CPT

## 2020-01-02 PROCEDURE — 85027 COMPLETE CBC AUTOMATED: CPT

## 2020-01-02 PROCEDURE — 92523 SPEECH SOUND LANG COMPREHEN: CPT

## 2020-01-02 PROCEDURE — 97530 THERAPEUTIC ACTIVITIES: CPT

## 2020-01-02 PROCEDURE — 99238 HOSP IP/OBS DSCHRG MGMT 30/<: CPT | Performed by: NURSE PRACTITIONER

## 2020-01-02 PROCEDURE — 36415 COLL VENOUS BLD VENIPUNCTURE: CPT

## 2020-01-02 PROCEDURE — 74011250637 HC RX REV CODE- 250/637: Performed by: NURSE PRACTITIONER

## 2020-01-02 PROCEDURE — 82962 GLUCOSE BLOOD TEST: CPT

## 2020-01-02 PROCEDURE — 74011636637 HC RX REV CODE- 636/637: Performed by: NURSE PRACTITIONER

## 2020-01-02 RX ADMIN — METFORMIN HYDROCHLORIDE 500 MG: 500 TABLET ORAL at 09:01

## 2020-01-02 RX ADMIN — ATORVASTATIN CALCIUM 40 MG: 40 TABLET, FILM COATED ORAL at 09:01

## 2020-01-02 RX ADMIN — ASPIRIN 81 MG 81 MG: 81 TABLET ORAL at 09:01

## 2020-01-02 RX ADMIN — LISINOPRIL 20 MG: 20 TABLET ORAL at 09:00

## 2020-01-02 RX ADMIN — INSULIN HUMAN 4 UNITS: 100 INJECTION, SOLUTION PARENTERAL at 12:02

## 2020-01-02 RX ADMIN — TICAGRELOR 90 MG: 90 TABLET ORAL at 12:08

## 2020-01-02 RX ADMIN — INSULIN HUMAN 2 UNITS: 100 INJECTION, SOLUTION PARENTERAL at 09:01

## 2020-01-02 NOTE — PROGRESS NOTES
01/01/20 1915   NIH Stroke Scale   Interval Other (comment)  (With TATYANA Chaudhry )   LOC 0   LOC Questions 0   LOC Commands 0   Best Gaze 0   Visual 0   Facial Palsy 0   Motor Right Arm 0   Motor Left Arm 0   Motor Right Leg 0   Motor Left Leg 0   Limb Ataxia 0   Sensory 0   Best Language 0   Dysarthria 0   Extinction and Inattention 0   Total 0

## 2020-01-02 NOTE — PROGRESS NOTES
SPEECH LANGUAGE PATHOLOGY: SPEECH-LANGUAGE/COGNITION: Initial Assessment and Discharge    NAME/AGE/GENDER: Josh Rangel is a 59 y.o. male  DATE: 2020  PRIMARY DIAGNOSIS: Stroke due to embolism of left vertebral artery ([facility] [I63.112]      ICD-10: Treatment Diagnosis: R41.841 Cognitive-Communication Deficit    INTERDISCIPLINARY COLLABORATION: Registered Nurse  PRECAUTIONS/ALLERGIES: Sulfur     SUBJECTIVE   Patient alert upright in bedside chair for assessment. Denies any changes in cognitive linguistic function. Patient was independent with all ADLs prior to CVA. He plans to return back to work as a . History of Present Injury/Illness: Mr. Pj Mckinley  has a past medical history of Diabetes ([facility] Hypertension, and MI (myocardial infarction) ([facility] (). Rizwan Napier He also  has a past surgical history that includes hx coronary stent placement () and ir perc art mech thromb infusion intracranial (2019). Previous Speech Therapy: NONE REPORTED    Problem List:  (Impairments causing functional limitations):  1. Cognitive-linguistic impairment- no deficits identified    Orientation:   Person  Place  Time  Situation     Pain: Pain Scale 1: Numeric (0 - 10)  Pain Intensity 1: 0         OBJECTIVE   [place] Cognitive Assessment       Raw Score: 27/30             Visuospatial/Executive functionin/5             Orientation: 6/6             Naming: 3/3             Memory: 4/5             Language: 3/3            Attention: 4/6            Abstraction: 2/2         ASSESSMENT   Patient presents with normal cognitive linguistic function scoring 27 out of 30 on the MOCA 8.3. A score of 26 or greater is considered WNL. No further speech therapy clinically indicated in acute care setting. Educated patient on option to receive speech therapy at next level of care or as an outpatient/home health if any deficits are noted when patient is completing higher level cognitive tasks.  Patient expressed understanding. Tool Used: MODIFIED HILARY SCALE (mRS)   Score   No Symptoms  [] 0   No significant disability despite symptoms; able to carry out all usual duties and activities  [x] 1   Slight disability; unable to carry out all previous activities but able to look after own affairs without assistance. [] 2   Moderate disability; requiring some help but able to walk without assistance  [] 3   Moderately severe disability; unable to walk without assistance and unable to attend to own bodily needs without assistance  [] 4   Severe disability; bedridden, incontinent, and requiring constant nursing care and attention  [] 5      Score:  Initial: 1    Interpretation of Tool: The Modified Hilary Scale is a 7-point scaled used to quantify level of disability as it relates to a patient's functional abilities. Current Medications:   No current facility-administered medications on file prior to encounter. Current Outpatient Medications on File Prior to Encounter   Medication Sig Dispense Refill    aspirin delayed-release 81 mg tablet Take  by mouth daily.  atorvastatin (LIPITOR) 40 mg tablet Take 1 Tab by mouth daily. 90 Tab 3    empagliflozin (JARDIANCE) 10 mg tablet Take 1 Tab by mouth daily. 90 Tab 3    metoprolol succinate (TOPROL-XL) 100 mg tablet TAKE 1 TABLET DAILY 90 Tab 3    metFORMIN (GLUCOPHAGE) 500 mg tablet Take 1 Tab by mouth two (2) times daily (with meals). 180 Tab 4    lisinopril (PRINIVIL, ZESTRIL) 20 mg tablet Take 1 Tab by mouth daily. 90 Tab 3    nitroglycerin (NITROSTAT) 0.4 mg SL tablet 1 Tab by SubLINGual route every five (5) minutes as needed for Chest Pain.  1 Bottle 3    Blood-Glucose Meter monitoring kit TEST DAILY 1 Kit 0    glucose blood VI test strips (ASCENSIA AUTODISC VI, ONE TOUCH ULTRA TEST VI) strip TEST DAILY 100 Strip 5    Lancets misc TEST DAILY 1 Each 11       PLAN    FREQUENCY/DURATION: No further speech therapy indicated at this time as cognitive linguistic function is within normal limits. - Recommendations for next treatment session: No additional speech therapy indicated at this time. REHABILITATION POTENTIAL FOR STATED GOALS: Excellent         RECOMMENDATIONS   STRATEGIES: n/a     EDUCATION:  · Recommendations discussed with Patient     RECOMMENDATIONS for CONTINUED SPEECH THERAPY: No further speech therapy indicated at this time.      Compliance with Program/Exercises: Compliant with assessment  Continuation of Skilled Services/Medical Necessity:   No cognitive linguistic deficits identified       SAFETY:  After treatment position/precautions:  · Up in chair  · Call light within reach    Total Treatment Duration:     Time In: 0942  Time Out: [address] [facility] 04, [address] [facility]

## 2020-01-02 NOTE — DISCHARGE INSTRUCTIONS
Patient Education     1. Follow up with Dr. Collette Jordan in 8 weeks. Our office will call you to set up appointment date and time. 2. Follow up with your primary provider asap, ensure they are aware of your recent admit and stroke and that you are now taking a new medication: Brilinta 90mg twice daily and an aspirin 81mg daily. 3. No driving or returning to work until follow up with Dr. Collette Jordan. Call our office if any concerns or questions. 4. Call our office with any concerns or questions at any time. Taking Aspirin and Other Antiplatelets Safely: Care Instructions  Your Care Instructions    Aspirin and other antiplatelet medicines help prevent blood clots from forming. They can help some people lower their risk of a heart attack or stroke. But these medicines can also make you more likely to bleed. That's why it's important to talk to your doctor before you start taking aspirin every day. It's not right for everyone. And if you and your doctor decide these medicines are right for you, learn how to take them safely. If you take aspirin, be sure you know how to take it. Your doctor can tell you what dose to take and how often to take it. One low-dose aspirin is 81 milligrams (mg). But the dose for daily aspirin can range from 81 mg to 325 mg. If you take another antiplatelet, take it as prescribed. Follow-up care is a key part of your treatment and safety. Be sure to make and go to all appointments, and call your doctor if you are having problems. It's also a good idea to know your test results and keep a list of the medicines you take. How can you care for yourself at home? · Before you start to take daily aspirin or some other antiplatelet, tell your doctor all the medicines, vitamins, herbal products, and supplements you take. · Tell your doctors, dentist, and pharmacist that you take an antiplatelet. · Take your medicine as your doctor directs.  Make sure that you understand exactly what your doctor wants you to do. If another doctor says to stop taking the medicine for any reason, talk to the doctor who prescribed it before you stop. · Take your medicine at the same time every day. · Do not chew or crush the coated or time-release forms of your medicine. · If you miss a dose, don't take an extra dose to make up for it. · Ask your doctor whether you can drink alcohol. And ask how much you can drink. When you take an antiplatelet, drinking too much raises your risk for liver damage and stomach bleeding. · If you are pregnant, are breastfeeding, or plan to become pregnant, talk to your doctor about what medicines are safe. · Talk with your doctor before you take a pain medicine. Many pain medicines have aspirin. Too much aspirin can be harmful. · Wear medical alert jewelry. This lets others know that you take an antiplatelet. You can buy it at most drugstores. · Try to avoid injuries that might make you bleed. For example, be careful when you exercise and when you play sports. Make your home safe to reduce your risk of falling. When should you call for help? Call 911 anytime you think you may need emergency care. For example, call if:    · You have a sudden, severe headache that is different from past headaches.    Call your doctor now or seek immediate medical care if:    · You have any abnormal bleeding, such as:  ? A nosebleed that you can't easily stop. ? Bloody or black stools, or rectal bleeding. ? Bloody or pink urine.     · You feel dizzy or lightheaded or feel like you may faint.    Watch closely for changes in your health, and be sure to contact your doctor if you have any problems. Where can you learn more? Go to http://yvonne-stephanie.info/. Enter N749 in the search box to learn more about \"Taking Aspirin and Other Antiplatelets Safely: Care Instructions. \"  Current as of: April 9, 2019  Content Version: 12.2  © 3801-1168 [employer].  Care instructions adapted under license by [employer] (which disclaims liability or warranty for this information). If you have questions about a medical condition or this instruction, always ask your healthcare professional. [address] any warranty or liability for your use of this information. Stroke: After Your Visit     Your Care Instructions     You have had a stroke. Risk factors for stroke include being overweight, smoking, and sedentary lifestyle. This means that the blood flow to a part of your brain was blocked for some time, which damages the nerve cells in that part of the brain. The part of your body controlled by that part of your brain may not function properly now. The brain is an amazing organ that can heal itself to some degree. The stroke you had damaged part of your brain, but other parts of your brain may take over in some way for the damaged areas. You have already started this process. Going home may be hard for you and your family. The more you can try to do for yourself, the better. Remember to take each day one at a time. Follow-up care is a key part of your treatment and safety. Be sure to make and go to all appointments, and call your doctor if you are having problems. Its also a good idea to know your test results and keep a list of the medicines you take. How can you care for yourself at home? Enter a stroke rehabilitation (rehab) program, if your doctor recommends it. Physical, speech, and occupational therapies can help you manage bathing, dressing, eating, and other basics of daily living. Eat a heart-healthy diet that is low in cholesterol, saturated fat, and salt. Eat lots of fresh fruits and vegetables and foods high in fiber. Increase your activities slowly. Take short rest breaks when you get tired. Gradually increase the amount you walk. Start out by walking a little more than you did the day before.   Do not drive until your doctor says it is okay.  It is normal to feel sad or depressed after a stroke. If the blues last, talk to your doctor. If you are having problems with urine leakage, go to the bathroom at regular times, including when you first wake up and at bedtime. Also, limit fluids after dinner. If you are constipated, drink plenty of fluids, enough so that your urine is light yellow or clear like water. If you have kidney, heart, or liver disease and have to limit fluids, talk with your doctor before you increase the amount of fluids you drink. Set up a regular time for using the toilet. If you continue to have constipation, your doctor may suggest using a bulking agent, such as Metamucil, or a stool softener, laxative, or enema. Medicines  Take your medicines exactly as prescribed. Call your doctor if you think you are having a problem with your medicine. You may be taking several medicines. ACE (angiotensin-converting enzyme) inhibitors, angiotensin II receptor blockers (ARBs), beta-blockers, diuretics (water pills), and calcium channel blockers control your blood pressure. Statins help lower cholesterol. Your doctor may also prescribe medicines for depression, pain, sleep problems, anxiety, or agitation. If your doctor has given you medicine that prevents blood clots, such as warfarin (Coumadin), aspirin combined with extended-release dipyridamole (Aggrenox), clopidogrel (Plavix), or aspirin to prevent another stroke, you should:  Tell your dentist, pharmacist, and other health professionals that you take these medicines. Watch for unusual bruising or bleeding, such as blood in your urine, red or black stools, or bleeding from your nose or gums. Get regular blood tests to check your clotting time if you are taking Coumadin. Wear medical alert jewelry that says you take blood thinners. You can buy this at most drugstores.   Do not take any over-the-counter medicines or herbal products without talking to your doctor first.  If you take birth control pills or hormone replacement therapy, talk to your doctor about whether they are right for you. For family members and caregivers  Make the home safe. Set up a room so that your loved one does not have to climb stairs. Be sure the bathroom is on the same floor. Move throw rugs and furniture that could cause falls, and make sure that the lighting is good. Put grab bars and seats in tubs and showers. Find out what your loved one can do and what he or she needs help with. Try not to do things for your loved one that your loved one can do on his or her own. Help him or her learn and practice new skills. Visit and talk with your loved one often. Try doing activities together that you both enjoy, such as playing cards or board games. Keep in touch with your loved one's friends as much as you can, and encourage them to visit. Take care of yourself. Do not try to do everything yourself. Ask other family members to help. Eat well, get enough rest, and take time to do things that you enjoy. Keep up with your own doctor visits, and make sure to take your medicines regularly. Get out of the house as much as you can. Join a local support group. Find out if you qualify for home health care visits to help with rehab or for adult day care. When should you call for help? Call 911 anytime you think you may need emergency care. For example, call if:  You have signs of another stroke. These may include:  Sudden numbness, paralysis, or weakness in your face, arm, or leg, especially on only one side of your body. New problems with walking or balance. Sudden vision changes. Drooling or slurred speech. New problems speaking or understanding simple statements, or you feel confused. A sudden, severe headache that is different from past headaches. Call 911 even if these symptoms go away in a few minutes. You cough up blood. You vomit blood or what looks like coffee grounds.   You pass maroon or very bloody stools. Call your doctor now or seek immediate medical care if:  You have new bruises or blood spots under your skin. You have a nosebleed. Your gums bleed when you brush your teeth. You have blood in your urine. Your stools are black and tarlike or have streaks of blood. You have vaginal bleeding when you are not having your period, or heavy period bleeding. You have new symptoms that may be related to your stroke, such as falls or trouble swallowing. Watch closely for changes in your health, and be sure to contact your doctor if you have any problems. Where can you learn more? Go to Yozio.be    Enter C294  in the search box to learn more about \"Stroke: After Your Visit\". © 9911-4542 [employer]. Care instructions adapted under license by [address] [place] [address] [facility] (which disclaims liability or warranty for this information). This care instruction is for use with your licensed healthcare professional. If you have questions about a medical condition or this instruction, always ask your healthcare professional. Korene Burkitt any warranty or liability for your use of this information. DISCHARGE SUMMARY from Nurse    PATIENT INSTRUCTIONS:    After general anesthesia or intravenous sedation, for 24 hours or while taking prescription Narcotics:  · Limit your activities  · Do not drive and operate hazardous machinery  · Do not make important personal or business decisions  · Do  not drink alcoholic beverages  · If you have not urinated within 8 hours after discharge, please contact your surgeon on call.     Report the following to your surgeon:  · Excessive pain, swelling, redness or odor of or around the surgical area  · Temperature over 100.5  · Nausea and vomiting lasting longer than 4 hours or if unable to take medications  · Any signs of decreased circulation or nerve impairment to extremity: change in color, persistent  numbness, tingling, coldness or increase pain  · Any questions    What to do at Home:  Recommended activity: Activity as tolerated    If you experience any of the following symptoms fever > 100.5, nausea, vomiting, pain to MD, chest pain and/or shortness of breath to ER please follow up with MD.    *  Please give a list of your current medications to your Primary Care Provider. *  Please update this list whenever your medications are discontinued, doses are      changed, or new medications (including over-the-counter products) are added. *  Please carry medication information at all times in case of emergency situations. These are general instructions for a healthy lifestyle:    No smoking/ No tobacco products/ Avoid exposure to second hand smoke  Surgeon General's Warning:  Quitting smoking now greatly reduces serious risk to your health. Obesity, smoking, and sedentary lifestyle greatly increases your risk for illness    A healthy diet, regular physical exercise & weight monitoring are important for maintaining a healthy lifestyle    You may be retaining fluid if you have a history of heart failure or if you experience any of the following symptoms:  Weight gain of 3 pounds or more overnight or 5 pounds in a week, increased swelling in our hands or feet or shortness of breath while lying flat in bed. Please call your doctor as soon as you notice any of these symptoms; do not wait until your next office visit. The discharge information has been reviewed with the patient. The patient verbalized understanding. Discharge medications reviewed with the patient and appropriate educational materials and side effects teaching were provided.   ___________________________________________________________________________________________________________________________________

## 2020-01-02 NOTE — PROGRESS NOTES
Insurance approval remained pending so decision made by MD for pt to dc to home today with outpatient PT/OT follow up. Referral submitted to [facility]. They will contact the pt to schedule the initial appointment. No other discharge needs or concerns identified or reported. Care Management Interventions  PCP Verified by CM: Yes  Mode of Transport at Discharge: Other (see comment)(spouse)  Transition of Care Consult (CM Consult): Discharge Planning, Other(PT/OT recommending [facility])  Physical Therapy Consult: Yes  Occupational Therapy Consult: Yes  Speech Therapy Consult: Yes  Current Support Network: Own Home, Lives with Spouse  Confirm Follow Up Transport: Family  The Plan for Transition of Care is Related to the Following Treatment Goals : PT/OT services to improve physical functioning.   Discharge Location  Discharge Placement: Home with outpatient services

## 2020-01-02 NOTE — DISCHARGE SUMMARY
Discharge Summary     Patient: Cammy Gillespie MRN: [id number]  SSN: [id number]    YOB: 1955  Age: 59 y.o. 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 Shannon Ball presented to [address] [place] [address] Dr. Gustavo Ramos  ED on 12-25-19 with acute right sided weakness and slurred speech with NIHSS of 1. The pt had a CT head and CTA/CTP of head and neck that showed acute occlusion of left vertebral artery at its origin with critical stenosis. The pt was a candidate for Mechanical Thrombectomy intervention and was taken to the IR suite with Dr. El Mora. The pt had TICI 3 results with left vertebral artery, the stenosis required angioplasty and was done with 4x15 Viatrac balloon. The pt was then admitted to our ICU under post MEEK ischemic stroke protocol. He has been seen by our PT/OT/ST and rehab teams and originally was scheduled for admit to our post acute rehab center, but as delay with insurance approval the pt has improved and wants to go home with outpt PT/OT as needed. The pt is aox3, ambulatory in unit with minimal assist and his speech is clear. The pt was seen by DR. Cory Cogan and I prior to [employer] and educated about need to call us with any concerns and to continue asa and brilinta as directed. The pt verbalized understanding. Aspirin response: 531. Platelet function:3 at dc. 1. Left Vert artery ischemic stroke: pt with TICI 3 results. Will dc home on asa 81mg po daily and brilinta 90mg po bid. Pt will follow up with Dr. Cory Cogan in 8 weeks in clinic.      2. LDL: 57 on admit, pt on lipitor 40mg PTA, and will continue on dc.    3. MRS: 1 on DC    4. NIHSS: 0 on DC. 5. Consulted with case mgt prior to dc for arrangement of PT/OT outpt as needed. 6. DC time of 25 minutes for dc education, med rec, and pt discussion. Consults: Physical Medicine and Rehabilitation    Significant Diagnostic Studies: labs daily, cerebral angiogram for mechanical thrombectomy, CT head, CTA head and neck, CT perfusion. 2D Echo. Disposition: home    Discharge Medications:   Current Discharge Medication List      START taking these medications    Details   ticagrelor (BRILINTA) 90 mg tablet Take 1 Tab by mouth every twelve (12) hours every twelve (12) hours. 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 atorvastatin (LIPITOR) 40 mg tablet Take 1 Tab by mouth daily. Qty: 90 Tab, Refills: 3    Associated Diagnoses: Mixed hyperlipidemia      empagliflozin (JARDIANCE) 10 mg tablet Take 1 Tab by mouth daily. Qty: 90 Tab, Refills: 3      metoprolol succinate (TOPROL-XL) 100 mg tablet TAKE 1 TABLET DAILY  Qty: 90 Tab, Refills: 3    Associated Diagnoses: Controlled diabetes mellitus type 2 with complications ([facility] [address] Essential hypertension      metFORMIN (GLUCOPHAGE) 500 mg tablet Take 1 Tab by mouth two (2) times daily (with meals). Qty: 180 Tab, Refills: 4    Associated Diagnoses: Controlled diabetes mellitus type 2 with complications (H[facility])      lisinopril (PRINIVIL, ZESTRIL) 20 mg tablet Take 1 Tab by mouth daily. Qty: 90 Tab, Refills: 3    Associated Diagnoses: Essential hypertension      nitroglycerin (NITROSTAT) 0.4 mg SL tablet 1 Tab by SubLINGual route every five (5) minutes as needed for Chest Pain.   Qty: 1 Bottle, Refills: 3      Blood-Glucose Meter monitoring kit TEST DAILY  Qty: 1 Kit, Refills: 0    Associated Diagnoses: Controlled diabetes mellitus type 2 with complications, unspecified long term insulin use status      glucose blood VI test strips (ASCENSIA AUTODISC VI, ONE TOUCH ULTRA TEST VI) strip TEST DAILY  Qty: 100 Strip, Refills: 5    Associated Diagnoses: Controlled diabetes mellitus type 2 with complications, unspecified long term insulin use status      Lancets misc TEST DAILY  Qty: 1 Each, Refills: 11    Associated Diagnoses: Controlled diabetes mellitus type 2 with complications, unspecified long term insulin use status             Activity: No driving until follow up with Dr. Alana Davenport. You may advance activities as tolerated and per PT/OT teams. Diet: Regular Diet  Wound Care: right groin puncture site CDI on dc with no signs of hematoma. Pulses intact. If any concerns or questions call our office asap. Follow-up Appointments   Procedures    FOLLOW UP VISIT Appointment in: Other (Specify) 8 weeks with Dr. Alana Davenport     8 weeks with Dr. Alana Davenport     Standing Status:   Standing     Number of Occurrences:   1     Order Specific Question:   Appointment in     Answer:    Other (Specify)       Signed By: Cayden Meek NP     January 2, 2020

## 2020-01-02 NOTE — PROGRESS NOTES
Problem: Diabetes Self-Management  Goal: *Disease process and treatment process  Description  Define diabetes and identify own type of diabetes; list 3 options for treating diabetes. Outcome: Progressing Towards Goal  Goal: *Incorporating nutritional management into lifestyle  Description  Describe effect of type, amount and timing of food on blood glucose; list 3 methods for planning meals. Outcome: Progressing Towards Goal  Goal: *Incorporating physical activity into lifestyle  Description  State effect of exercise on blood glucose levels. Outcome: Progressing Towards Goal  Goal: *Developing strategies to promote health/change behavior  Description  Define the ABC's of diabetes; identify appropriate screenings, schedule and personal plan for screenings. Outcome: Progressing Towards Goal  Goal: *Using medications safely  Description  State effect of diabetes medications on diabetes; name diabetes medication taking, action and side effects. Outcome: Progressing Towards Goal  Goal: *Monitoring blood glucose, interpreting and using results  Description  Identify recommended blood glucose targets  and personal targets. Outcome: Progressing Towards Goal  Goal: *Prevention, detection, treatment of acute complications  Description  List symptoms of hyper- and hypoglycemia; describe how to treat low blood sugar and actions for lowering  high blood glucose level. Outcome: Progressing Towards Goal  Goal: *Prevention, detection and treatment of chronic complications  Description  Define the natural course of diabetes and describe the relationship of blood glucose levels to long term complications of diabetes.   Outcome: Progressing Towards Goal  Goal: *Developing strategies to address psychosocial issues  Description  Describe feelings about living with diabetes; identify support needed and support network  Outcome: Progressing Towards Goal  Goal: *Insulin pump training  Outcome: Progressing Towards Goal  Goal: *Sick day guidelines  Outcome: Progressing Towards Goal  Goal: *Patient Specific Goal (EDIT GOAL, INSERT TEXT)  Outcome: Progressing Towards Goal     Problem: Patient Education: Go to Patient Education Activity  Goal: Patient/Family Education  Outcome: Progressing Towards Goal     Problem: Falls - Risk of  Goal: *Absence of Falls  Description  Document Mayito Alvarez Fall Risk and appropriate interventions in the flowsheet. Outcome: Progressing Towards Goal  Note: Fall Risk Interventions:  Mobility Interventions: Bed/chair exit alarm         Medication Interventions: Teach patient to arise slowly    Elimination Interventions: Call light in reach              Problem: Patient Education: Go to Patient Education Activity  Goal: Patient/Family Education  Outcome: Progressing Towards Goal     Problem: Pressure Injury - Risk of  Goal: *Prevention of pressure injury  Description  Document Taiwo Scale and appropriate interventions in the flowsheet.   Outcome: Progressing Towards Goal  Note: Pressure Injury Interventions:  Sensory Interventions: Assess changes in LOC    Moisture Interventions: Absorbent underpads    Activity Interventions: Assess need for specialty bed    Mobility Interventions: Assess need for specialty bed, PT/OT evaluation, HOB 30 degrees or less    Nutrition Interventions: Document food/fluid/supplement intake, Offer support with meals,snacks and hydration    Friction and Shear Interventions: HOB 30 degrees or less, Minimize layers                Problem: Patient Education: Go to Patient Education Activity  Goal: Patient/Family Education  Outcome: Progressing Towards Goal     Problem: Patient Education: Go to Patient Education Activity  Goal: Patient/Family Education  Outcome: Progressing Towards Goal     Problem: TIA/CVA Stroke: Day 2 Until Discharge  Goal: Off Pathway (Use only if patient is Off Pathway)  Outcome: Progressing Towards Goal  Goal: Activity/Safety  Outcome: Progressing Towards Goal  Goal: Diagnostic Test/Procedures  Outcome: Progressing Towards Goal  Goal: Nutrition/Diet  Outcome: Progressing Towards Goal  Goal: Discharge Planning  Outcome: Progressing Towards Goal  Goal: Medications  Outcome: Progressing Towards Goal  Goal: Respiratory  Outcome: Progressing Towards Goal  Goal: Treatments/Interventions/Procedures  Outcome: Progressing Towards Goal  Goal: Psychosocial  Outcome: Progressing Towards Goal  Goal: *Verbalizes anxiety and depression are reduced or absent  Outcome: Progressing Towards Goal  Goal: *Absence of aspiration  Outcome: Progressing Towards Goal  Goal: *Absence of deep venous thrombosis signs and symptoms(Stroke Metric)  Outcome: Progressing Towards Goal  Goal: *Optimal pain control at patient's stated goal  Outcome: Progressing Towards Goal  Goal: *Tolerating diet  Outcome: Progressing Towards Goal  Goal: *Ability to perform ADLs and demonstrates progressive mobility and function  Outcome: Progressing Towards Goal  Goal: *Stroke education continued(Stroke Metric)  Outcome: Progressing Towards Goal     Problem: Ischemic Stroke: Discharge Outcomes  Goal: *Verbalizes anxiety and depression are reduced or absent  Outcome: Progressing Towards Goal  Goal: *Verbalize understanding of risk factor modification(Stroke Metric)  Outcome: Progressing Towards Goal  Goal: *Hemodynamically stable  Outcome: Progressing Towards Goal  Goal: *Absence of aspiration pneumonia  Outcome: Progressing Towards Goal  Goal: *Aware of needed dietary changes  Outcome: Progressing Towards Goal  Goal: *Verbalize understanding of prescribed medications including anti-coagulants, anti-lipid, and/or anti-platelets(Stroke Metric)  Outcome: Progressing Towards Goal  Goal: *Tolerating diet  Outcome: Progressing Towards Goal  Goal: *Aware of follow-up diagnostics related to anticoagulants  Outcome: Progressing Towards Goal  Goal: *Ability to perform ADLs and demonstrates progressive mobility and function  Outcome: Progressing Towards Goal  Goal: *Absence of DVT(Stroke Metric)  Outcome: Progressing Towards Goal  Goal: *Absence of aspiration  Outcome: Progressing Towards Goal  Goal: *Optimal pain control at patient's stated goal  Outcome: Progressing Towards Goal  Goal: *Home safety concerns addressed  Outcome: Progressing Towards Goal  Goal: *Describes available resources and support systems  Outcome: Progressing Towards Goal  Goal: *Verbalizes understanding of activation of EMS(911) for stroke symptoms(Stroke Metric)  Outcome: Progressing Towards Goal  Goal: *Understands and describes signs and symptoms to report to providers(Stroke Metric)  Outcome: Progressing Towards Goal  Goal: *Neurolgocially stable (absence of additional neurological deficits)  Outcome: Progressing Towards Goal  Goal: *Verbalizes importance of follow-up with primary care physician(Stroke Metric)  Outcome: Progressing Towards Goal  Goal: *Smoking cessation discussed,if applicable(Stroke Metric)  Outcome: Progressing Towards Goal  Goal: *Depression screening completed(Stroke Metric)  Outcome: Progressing Towards Goal     Problem: Patient Education: Go to Patient Education Activity  Goal: Patient/Family Education  Outcome: Progressing Towards Goal     Problem: Patient Education: Go to Patient Education Activity  Goal: Patient/Family Education  Outcome: Progressing Towards Goal

## 2020-01-02 NOTE — PROGRESS NOTES
Problem: Mobility Impaired (Adult and Pediatric)  Goal: *Acute Goals and Plan of Care (Insert Text)  Description  LTG:  (1.)Mr. Carito Javed will move from supine to sit and sit to supine , scoot up and down and roll side to side in bed with MODIFIED INDEPENDENCE within 7 treatment day(s). (2.)Mr. Carito Javed will transfer from bed to chair and chair to bed with STAND BY ASSIST using the least restrictive device within 7 treatment day(s). (3.)Mr. Carito Javed will ambulate with CONTACT GUARD ASSIST for 80 feet with the least restrictive device within 7 treatment day(s). (4.)Mr. Carito Javed will maintain at least GOOD static/dynamic sitting balance for improved safety within 7 treatment days. (5.)Mr. Carito Javed will perform standing static and dynamic balance activities x 15 minutes with CONTACT GUARD ASSIST to improve safety within 7 treatment day(s). ________________________________________________________________________________________________     Outcome: Progressing Towards Goal     PHYSICAL THERAPY: Daily Note and AM 1/2/2020  INPATIENT: PT Visit Days : 4  Payor: Barry Salazar / Plan: [facility] / Product Type: PPO /       NAME/AGE/GENDER: Francine Boswell is a 59 y.o. male   PRIMARY DIAGNOSIS: Stroke due to embolism of left vertebral artery ([facility] [I63.112] Stroke due to embolism of left vertebral artery (HCC) Stroke due to embolism of left vertebral artery (HCC)       ICD-10: Treatment Diagnosis:    · Generalized Muscle Weakness (M62.81)  · Difficulty in walking, Not elsewhere classified (R26.2)   Precaution/Allergies:  Sulfur      ASSESSMENT:     The patient is seated in the recliner chair upon contact and agreeable to PT. The patient performs all transfers with mod I.  He performs sit to stand and then ambulates 200' with no AD and SBA. He demonstrates significantly improved standing balance from previous visit.   He then returns to the room and performs x5 STS with mod I and good standing balance with each. He is then positioned for comfort with all needs in reach. Overall the patient is making very good progress toward therapy goals as indicated by increased gait distances and decreased assistance levels. Recommending the patient can now go home with no needs. Will continue skilled PT treatment as patient is still below functional baseline. Per initial:  Mr. Pham Poe is a 59 y.o. male in the hospital for CVA workup with finding of L vertebral artery occulusion. Pt underwent mechanical endarterectomy 12/25/19 by Dr. Janey Olguin. Pt reports that he lives in a one story house with wife that has 3 steps to enter. This section established at most recent assessment   PROBLEM LIST (Impairments causing functional limitations):  1. Decreased Strength  2. Decreased Transfer Abilities  3. Decreased Ambulation Ability/Technique  4. Decreased Balance  5. Decreased Activity Tolerance  6. Increased Fatigue   INTERVENTIONS PLANNED: (Benefits and precautions of physical therapy have been discussed with the patient.)  1. Balance Exercise  2. Bed Mobility  3. Family Education  4. Gait Training  5. Home Exercise Program (HEP)  6. Neuromuscular Re-education/Strengthening  7. Therapeutic Activites  8. Therapeutic Exercise/Strengthening  9. Transfer Training     TREATMENT PLAN: Frequency/Duration: 3 times a week for duration of hospital stay  Rehabilitation Potential For Stated Goals: Good     REHAB RECOMMENDATIONS (at time of discharge pending progress):    Placement: It is my opinion, based on this patient's performance to date, that Mr. Pham Poe may benefit from intensive therapy at an [address] [place] [address] after discharge due to potential to make ongoing and sustainable functional improvement that is of practical value. .  Equipment:    None at this time              HISTORY:   History of Present Injury/Illness (Reason for Referral):  CVA  Past Medical History/Comorbidities:   Mr. Pham Poe  has a past medical history of Diabetes ([facility] Hypertension, and MI (myocardial infarction) ([facility] (). Mr. Marion Monroe  has a past surgical history that includes hx coronary stent placement () and ir perc art [facility] thromb infusion intracranial (2019). Social History/Living Environment:   Home Environment: Private residence  # Steps to Enter: 3  Rails to Enter: No  One/Two Story Residence: One story  Living Alone: No  Support Systems: Family member(s), Spouse/Significant Other/Partner  Patient Expects to be Discharged to[de-identified] Unknown  Current DME Used/Available at Home: None  Tub or Shower Type: Shower  Prior Level of Function/Work/Activity:  Lives with wife and independent PTA. Works full time. Number of Personal Factors/Comorbidities that affect the Plan of Care: 1-2: MODERATE COMPLEXITY   EXAMINATION:   Most Recent Physical Functioning:   Gross Assessment:                  Posture:     Balance:  Sitting: Intact  Standing: Impaired  Standing - Static: Good  Standing - Dynamic : Good Bed Mobility:     Wheelchair Mobility:     Transfers:  Sit to Stand: Modified independent  Stand to Sit: Modified independent  Gait:     Base of Support: Narrowed  Gait Abnormalities: Decreased step clearance  Distance (ft): 200 Feet (ft)  Assistive Device: Walker, rolling  Ambulation - Level of Assistance: Stand-by assistance;Supervision      Body Structures Involved:  1. Nerves  2. Muscles Body Functions Affected:  1. Sensory/Pain  2. Neuromusculoskeletal  3. Movement Related Activities and Participation Affected:  1. General Tasks and Demands  2. Communication  3. Mobility  4. Self Care  5. Domestic Life  6.  Community, Social and [facility] [address]   Number of elements that affect the Plan of Care: 4+: HIGH COMPLEXITY   CLINICAL PRESENTATION:   Presentation: Evolving clinical presentation with changing clinical characteristics: MODERATE COMPLEXITY   CLINICAL DECISION MAKIN [facility] Mobility Inpatient Short Form  How much difficulty does the patient currently have. .. Unable A Lot A Little None   1. Turning over in bed (including adjusting bedclothes, sheets and blankets)? [] 1   [] 2   [] 3   [x] 4   2. Sitting down on and standing up from a chair with arms ( e.g., wheelchair, bedside commode, etc.)   [] 1   [] 2   [x] 3   [] 4   3. Moving from lying on back to sitting on the side of the bed? [] 1   [] 2   [x] 3   [] 4   How much help from another person does the patient currently need. .. Total A Lot A Little None   4. Moving to and from a bed to a chair (including a wheelchair)? [] 1   [x] 2   [] 3   [] 4   5. Need to walk in hospital room? [] 1   [x] 2   [] 3   [] 4   6. Climbing 3-5 steps with a railing? [] 1   [x] 2   [] 3   [] 4   © 2007, Trustees of [address] [place] [address], under license to [employer]. All rights reserved      Score:  Initial: 16 Most Recent: X (Date: -- )    Interpretation of Tool:  Represents activities that are increasingly more difficult (i.e. Bed mobility, Transfers, Gait). Medical Necessity:     · Patient demonstrates   · good  ·  rehab potential due to higher previous functional level. Reason for Services/Other Comments:  · Patient continues to require skilled intervention due to   · Decreased functional mobility and balance   · . Use of outcome tool(s) and clinical judgement create a POC that gives a: Questionable prediction of patient's progress: MODERATE COMPLEXITY            TREATMENT:   (In addition to Assessment/Re-Assessment sessions the following treatments were rendered)   Pre-treatment Symptoms/Complaints:  \"I'm ready\"  Pain: Initial:   Pain Intensity 1: 0  Post Session:  0       Therapeutic Activity: (    9 minutes): Therapeutic activities including Chair transfers, Ambulation on level ground and static and dynamic balance to improve mobility, strength, balance and coordination. Required minimal   to promote static and dynamic balance in standing. Therapeutic Exercise: (  0 minutes):  Exercises per grid below to improve mobility, strength and balance. Required moderate verbal cues to promote proper body alignment, promote proper body posture and promote proper body mechanics. Progressed repetitions as indicated. Date:  12/30/19 Date:   Date:     Activity/Exercise Parameters Parameters Parameters   Calf raises x10     Standing abduction x10     Standing marching x10     Standing SLR x10     Tandem balance x2 min B     NBOS stance x2 min                    Braces/Orthotics/Lines/Etc:   · O2 Device: Room air  Treatment/Session Assessment:    · Response to Treatment:  See above  · Interdisciplinary Collaboration:   o Physical Therapy Assistant  o Registered Nurse  · After treatment position/precautions:   o Up in chair  o Bed/Chair-wheels locked  o Bed in low position  o Call light within reach  o RN notified   · Compliance with Program/Exercises: Will assess as treatment progresses  · Recommendations/Intent for next treatment session: \"Next visit will focus on advancements to more challenging activities and reduction in assistance provided\".   Total Treatment Duration:  PT Patient Time In/Time Out  Time In: 1145  Time Out: KRISTEL Mazariegos

## 2020-01-02 NOTE — PROGRESS NOTES
01/02/20 0738   NIH Stroke Scale   Interval Other (comment)   LOC 0   LOC Questions 0   LOC Commands 0   Best Gaze 0   Visual 0   Facial Palsy 0   Motor Right Arm 0   Motor Left Arm 0   Motor Right Leg 0   Motor Left Leg 0   Limb Ataxia 0   Sensory 0   Best Language 0   Dysarthria 0   Extinction and Inattention 0   Total 0

## 2020-01-02 NOTE — PROGRESS NOTES
Problem: Falls - Risk of  Goal: *Absence of Falls  Description  Document Uvaldo Richey Fall Risk and appropriate interventions in the flowsheet.   Outcome: Progressing Towards Goal  Note: Fall Risk Interventions:  Mobility Interventions: Bed/chair exit alarm         Medication Interventions: Assess postural VS orthostatic hypotension, Bed/chair exit alarm, Teach patient to arise slowly    Elimination Interventions: Bed/chair exit alarm, Call light in reach

## 2020-01-23 NOTE — ADT AUTH CERT NOTES
Chantal Narayan Physician [facility] Specialty Rama Osorio [address] Neurosurgery Fermin Helene Physician Provider Summary Title Resident? Provider type MD No Physician Primary Contact Information Phone Fax E-mail Address [phone] Not available [address] [employer] [facility] [address] Specialties Neurosurgery     
      
Department Name [facility] NEUROENDOVASCULAR [id number] Nurse Navigator Assigned to Department Nurse Navigator Phone  
   
  
   
NPI AND UPIN  
 
NPI NPI  
NATIONAL PROVIDER ID [[id number] BRUNA - Yolanda Neg [[id number] PROVIDER LINK [[id number] [place] PROVIDER ID [[id number] [place] Median [[id number]] [id number]

## 2020-01-28 NOTE — ADT AUTH CERT NOTES
4TH ATTEMPT FOR IP NOTIFICATION!!! 
 
ATTENDING/ADMITTING PHYSICIAN: Humphrey Fiore MD 
NPI: [id number] ADDRESS:  
 Ramon Buckley [address] [place] [address] PHONE NUMBER: [phone] FAX NUMBER: [phone] Facility Name: [address] [place] [address]           
  
  
  
  
  
   
Patient Demographics Patient Name Janey Rosales Sex Male  
1955 Address [address] [facility] Phone [phone] (Home) [phone] (Mobile) *Preferred* Hospital Account Name Acct ID Class Status Primary Coverage Janey Rosales [id number] INPATIENT Billed [place] BLUE [place]OSS [facility]  
  
   
Guarantor Account (for Hospital Account [de-identified]) Name Relation to Pt Service Area Active? Acct Type Janey Rosales Self [address] Yes Personal/Family Address Phone [address] [place] [address] [facility],First Floor [phone])    
  
   
Coverage Information (for Hospital Account [de-identified]) F/O Payor/Plan Subscriber  Subscriber Sex Precert # BLUE CROSS/[place] BLUE CROSS OF [facility] 55 M Subscriber Subscriber # Janey Rosales [id number] [facility] # Group Name [id number] Address Phone [address] Policy Number Status Effective Date Benefits Phone [id number] -  - Auth/Cert UDR#[id number]  
  
   
Diagnosis Codes Comments TIA involving basilar artery  ICD-10-CM: G45.0 ICD-9-CM: 435.0   
  
   
Admission Information Arrival Date/Time: 2019 1502 Admit Date/Time: 2019 1510 IP Adm. Date/Time: 2019 1653 Admission Type: Emergency Point of Origin: Non-health Care Facility/self Admit Category:   
Means of Arrival: Car Primary Service: Medicine Secondary Service: N/A Transfer Source:  Service Area: [address] [place] [address] Unit: [facility] 7 MED SURG Admit Provider: Humphrey Fiore MD Attending Provider: Gustavo Lackey DO Referring Provider:   
Admission Information Attending Provider Admission Dx Admitted On  
 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    
Admission Information Unit/Bed: [facility] 7 MED SURG/ Service: MEDICINE Admitting provider: Tanmay Valente MD Phone: [phone] Attending provider:  Phone: PCP: Natalya Garcia MD Phone: [phone] Admission dx:  Patient class: I Admission type: ER    
Patient Demographics Patient Name Juanetta Crigler [address] 
[id number] Sex Male  
1955 Address [address] [place] [address] Phone [phone] (Home) [phone] (Mobile) *Preferred* H&P Notes  
 
 H&P by Timo Patrick NP at 19 documented on ED to Hosp-Admission (Discharged) from 2019 in [place] [address] MED SURG Author: Timo Patrick NP Author Type: Nurse Practitioner Filed: 19 1037 Note Status: Attested Addendum Cosign: Cosigned by Michael Torrez MD at 19 Date of Service: 19 : Timo Patrick NP (Nurse Practitioner) Prior Versions: 1. Timo Patrick NP (Nurse Practitioner) at 19 1028 - Cosign Needed Attestation signed by Michael Torrez MD at 19 [phone] [address] [facility] [address] [facility]   
  
NEUROVASCULAR SURGERY - ATTENDING PROGRESS NOTE 
  
I have personally seen and examined the patient. I agree with the exam findings and assessment in the extender's note. 
  
  
 
  Kerry Adan MD, [facility] [address] [facility]  
  
   
  
  
 
History and Physical 
  
Patient: Danny Campos MRN: [id number]  SSN: [id number] YOB: 1955  Age: 59 y.o. Sex: male   
  
Subjective:   
  
Danny Campos is a 59 y.o. male R hand dominant, who presented to the [facility] ED around 1500 today with right sided weakness, dysarthria and \"thick speech\". Last Known normal was 1400 today. The pt states he was on computer at home and he was unable to use his right hand to move computer mouse, he then noted his right leg to be weak, \"felt like a noodle\" and he was brought into ED by his wife JUNIOR. The pt had initial NIHSS 1 for dysarthria, head CT neg for any acute ICH. The pt was seen by our Tele Neuro team and deemed not a candidate for Activase because they felt the pt was having a TIA. The pt was re-evaluated by ED Dr. Thurman Babinski and pt was unable to ambulate in the ED, he became very dizzy and nauseated. A CTA/CTP was obtained and evaluated by Dr. Arlene Bravo, pt found to have left vert clot at origin with stenosis, pt candidate for MEEK and taken emergently for mechanical thrombectomy. Consent obtained prior to procedure with Dr. Juanito Scruggs.  
  
  
    
Past Medical History:  
Diagnosis Date  Diabetes ([facility]    
 Hypertension    
 MI (myocardial infarction) ([facility] [address] 2002  
  100% blockage in right coronary artery.  
  
     
Past Surgical History:  
Procedure Laterality Date  HX CORONARY STENT PLACEMENT   2002  
  right coronary stent Family History Problem Relation Age of Onset  Heart Disease Father    
 Heart Attack Father    
  
Social History  
  
    
Tobacco Use  Smoking status: Former Smoker  Smokeless tobacco: Former User Substance Use Topics  Alcohol use: No  
  
       
Prior to Admission medications Medication Sig Start Date End Date Taking? Authorizing Provider  
aspirin delayed-release 81 mg tablet Take  by mouth daily.       Provider, Historical  
atorvastatin (LIPITOR) 40 mg tablet Take 1 Tab by mouth daily. 11/21/19     Christina Montgomrey MD  
empagliflozin (JARDIANCE) 10 mg tablet Take 1 Tab by mouth daily.  11/21/19     Christina [facility] MD  
metoprolol succinate (TOPROL-XL) 100 mg tablet TAKE 1 TABLET DAILY 5/9/19     Francella Frankel, MD  
metFORMIN (GLUCOPHAGE) 500 mg tablet Take 1 Tab by mouth two (2) times daily (with meals). 11/1/18     Francella Frankel, MD  
lisinopril (PRINIVIL, ZESTRIL) 20 mg tablet Take 1 Tab by mouth daily. 11/1/18     Francella Frankel, MD  
nitroglycerin (NITROSTAT) 0.4 mg SL tablet 1 Tab by SubLINGual route every five (5) minutes as needed for Chest Pain. 2/2/18     Ramon Wilson MD  
Blood-Glucose Meter monitoring kit TEST DAILY 1/22/18     Francella Frankel, MD  
glucose blood VI test strips (ASCENSIA AUTODISC VI, ONE TOUCH ULTRA TEST VI) strip TEST DAILY 1/22/18     Francella Frankel, MD  
Lancets misc TEST DAILY 1/22/18     Francella Frankel, MD  
  
  
    
Allergies Allergen Reactions  Sulfur Unknown (comments)  
  
  
Review of Systems: 
Constitutional: well nourished male in NAD Eyes:  no change in visual acuity, no photophobia Ears, nose, mouth, throat, and face: no  Odynphagia, dysphagia, no thrush or exudate, negative for chronic sinus congestion, recurrent headaches Respiratory: negative for SOB, hemoptysis or cough Cardiovascular: negative for CP, palpitations, or PND Gastrointestinal: negative for abdominal pain, no hematemesis, hematochezia or BRBPR Genitourinary: no urgency, frequency, or dysuria, no nocturia Integument/breast: negative for skin rash or skin lesions Hematologic/lymphatic: negative for known bleeding disorder Musculoskeletal:negative for joint pain or joint tenderness Neurological: dizziness, nausea, right arm \"heaviness\", right leg weak with slurred speech. Behavioral/Psych: negative for depression or chronic anxiety,  
  
  
Objective:  
  
      
Vitals:  
  12/25/19 1740 12/25/19 1745 12/25/19 1750 12/25/19 1755 BP: 134/69 142/73 130/74 133/71 Pulse: 71 65 69 69 Resp: 16 14 16 16 Temp:          
SpO2: 96% 96% 95% 95% Weight:          
Height:          
  
  
Physical Exam: General:  Alert, cooperative, no distress, appears stated age. HEENT: Supple, symmetrical, trachea midline, no adenopathy, thyroid: no enlargment/tenderness/nodules, no carotid bruit and no JVD. +6th nerve R.  
Lungs:   Clear to auscultation bilaterally. Heart:  Regular rate and rhythm, S1, S2 normal, no murmur, click, rub or gallop. Abdomen:   Soft, non-tender. Bowel sounds normal. No masses,  No organomegaly. Extremities: Extremities normal, atraumatic, no cyanosis or edema. Pulses: 2+ and symmetric all extremities. Skin: Skin color, texture, turgor normal. No rashes or lesions Neurologic: Pt aox3, will follow commands. RUE mild drift, pt states paresthesia. Speech mild dysarthria.   
  
  
Assessment:  
  
           
Hospital Problems  Date Reviewed: 2/4/2019  
        Codes Class Noted POA  
  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  
     
   
NIHSS ON ADMIT: 2 DYSPHAGIA SCORE ON ADMIT: 7 ( Kept NPO for MEEK) MALLAMPATI SCORE ON ADMIT: 2 
  
Plan:  
  
NEURO:pt admitted to ICU post MEEK of left vert artery and angioplasty for ischemic stroke protocol. Pt with left vert artery stenosis and thrombus. Pt NIHSS 1-2 on admit and remains 1 on admit to ICU. Pt is aox3, follows commands and zarco. Pt does have noted fine motor deficits on RUE and speech is \"thick\". Pt is able to swallow and maintain airway. PT/OT/ST consulted. Post MEEK CT head is stable. Pt with dissection left vert during procedure. Pt started on asa/plavix. Will repeat imaging in am: CT head/CTA head and neck and CTP. Will order MRI brain. RESP:pt tolerating room air. CV:SBP goal <180. Trop neg. 2D Echo pending. Trop neg. SCD. Asa/plavix. HEME: HH: 10/52,  NEPH: bun/cr: 20/1.28 GI:NPO on admit. Will do STAND for swallow eval, ST consulted. IVF's @75cc/hr. ID:afebrile, no abx LINES:IVx2, bland.  
  
Dr. Ben Gilliam discussed plan of care with pt and wife.  
  
 Signed By: Trevon Carreon NP   
  2019   
   
  
Patient Demographics Patient Name Ciro Paris [facility] 
[id number] Sex Male  
1955 Address [address] [place] [address] Phone [phone] (Home) [phone] (Mobile) *Preferred*  
CSN:  
[id number] Admit Date: Admit Time Room Bed Dec 25, 2019  3:10  [44029] 01 [919] Attending Providers Provider Pager From To Montez Boone DO  19 Madisyn Robertson MD  19 Sarita Naqvi MD  19 Emergency Contact(s) Name Relation Home Work Mobile Nadia Bell Spouse [phone] Michael Bell Son [phone] Utilization Reviews  
 
   
dos 19 by Judy Davidson RN  
 
   
Review Entered Review Status 2019 08:33 In Primary  
   
Criteria Review Pt aox3 this am. Maew. R groin is CDI with pulses intact +2. Pt continues to have nausea and vomiting with movement. R 6th nerve palsy remains. Pt states left oral paresthesia and \"thick\" tongue. Pt is able to move RUE, but clumsy and cant control fine motor. Airway patent. MRI brain pending this am. 
  
  
      
Current Facility-Administered Medications Medication Dose Route Frequency  acetaminophen (TYLENOL) tablet 650 mg  650 mg Oral Q4H PRN  
 NUTRITIONAL SUPPORT ELECTROLYTE PRN ORDERS   Does Not Apply PRN  
 aspirin tablet 325 mg  325 mg Oral DAILY  clopidogrel (PLAVIX) tablet 75 mg  75 mg Oral DAILY  ondansetron (ZOFRAN) injection 4 mg  4 mg IntraVENous Q6H PRN  
 atorvastatin (LIPITOR) tablet 40 mg  40 mg Oral DAILY  lisinopril (PRINIVIL, ZESTRIL) tablet 20 mg  20 mg Oral DAILY  insulin regular (NOVOLIN R, HUMULIN R) injection   SubCUTAneous Q6H  
 HYDROcodone-acetaminophen (NORCO) 7.5-325 mg per tablet 1 Tab  1 Tab Oral Q4H PRN  
  
  
  
Skin: Skin color, texture, turgor normal. No rashes or lesions. R groin CDI. Neurologic: Aox3, zarco. RUE fine motor decreased. Left facial paresthesia, speech is clear but \"thick\". Pt admits to intermittent double vision OU, but individual denies. Nausea with vomiting with movement.   
  
  
Assessment/ Plan:  
  
Active Problems: 
  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  
  
  
NEURO:pt admitted to ICU post MEEK of left vert artery and angioplasty for ischemic stroke protocol. Pt with left vert artery stenosis and thrombus. Pt had repeat imaging this am and stable. Left vert artery dissection stable, on asa/plavix-will send responses in am. MRI brain pending this am. PT/OT/ST to see pt. Airway patent.  
RESP:pt tolerating room air.  
CV:SBP goal <180. Trop neg. 2D Echo pending. SCD. Asa/plavix. LDL 57- lipitor 40mg daily. Darren Fees: 15/44, [id number] NEPH: bun/cr: 15/0.9 GI:advance diet as tolerated, tolerating po meds. ST pending this am. nadia Angeles.  ID:afebrile, no abx LINES:IVx2, bland.   
   
Stroke: Ischemic - Care Day 2 (12/26/2019) by Junaid Frederick RN  
 
   
Review Entered Review Status 12/26/2019 10:16 Completed  
   
Criteria Review Care Day: 2 Care Date: 12/26/2019 Level of Care: ICU Guideline Day 2 Level Of Care (X) Stroke unit, ICU, telemetry, or floor 12/26/2019 10:16:18 EST by Anuj Elliott   
  ICU Clinical Status   
(X) * Hemodynamic stability 12/26/2019 10:16:18 EST by Anuj Elliott   
  Vitals- 98.7,  137/67, 58, 16, 96% on RA   
( ) * Mental status at baseline or stable ( ) * Neurologic deficits absent or stable ( ) * Unimpaired swallowing ( ) * Tolerates sitting in chair   
( ) * Feeding with or without assistance Activity ( ) * Up to chair Routes ( ) * Oral hydration, medications ( ) * Advance diet as tolerated Interventions   
(X) Neurologic checks (X) Glucose control 12/26/2019 10:16:18 EST by Anuj Elliott   
  Insulin r sc ss q6hrs (X) Possible physical therapy 12/26/2019 10:16:18 EST by Lashanda Mandel   
  ordered (X) Possible occupational and speech therapy 12/26/2019 10:16:18 EST by Lashanda Mandel   
  ordered * Milestone Additional Notes 12/26/19 Needs MD MARIANO added when available CT Head-   
1. Interval patency of the proximal left cervical vertebral artery which however demonstrates severe stenosis in this region, as above. 2. Improvement with near resolution of the partially occlusive thrombus in the basilar tip. 3. Persistent occlusion of the right superior cerebellar artery. 4. At least moderate stenosis of the cervical right vertebral artery origin as before. CT Perf w/ CBF- Small region of hypoperfusion within the superior right cerebellumappears smaller than seen   (7 mL compared to 17.7 mL) Chloride: 109 (H) Anion gap: 6 (L) Glucose: 135 (H) Vitals- 98.7,  137/67, 58, 16, 96% on RA Meds- [id number] po qd, Lipitor 75mg po qhs, insulin r sc q6hrs, Zofran 8mg iv prn x1, Prinivil 20mg po qd, Plavix 75mg po qd    
Stroke: Ischemic - Care Day 1 (12/25/2019) by Geneva Owens RN  
 
   
Review Entered Review Status 12/26/2019 10:13 Completed  
   
Criteria Review Care Day: 1 Care Date: 12/25/2019 Level of Care: ICU Guideline Day 1 Level Of Care (X) Stroke unit, ICU, [C] telemetry, or floor 12/26/2019 10:13:09 EST by Lashanda Mandel   
  IP ICU Clinical Status   
(X) * Clinical Indications met [D]   
12/26/2019 10:13:09 EST by Lashanda Mandel   
  Pt w/ stroke symptoms and was found to have occlusion of his left vertebral artery. I am indicating him for emergent endovascular thrombectomy to try to restore blood flow to the brain. (X) Cerebral hemorrhage excluded Activity   
(X) Bed rest   
12/26/2019 10:13:09 EST by Lashanda Mandel   
  complete bed rest   
Routes (X) IV fluids, medications 12/26/2019 10:13:09 EST by Lashanda Mandel   Meds- [id number] po qd, Lipitor 75mg po qhs, insulin r sc q6hrs, Zofran 8mg iv prn x1, Fentanyl 50mcg iv x2, Plavix 75mg po qd Interventions   
(X) Neurologic checks [place]) Neurology consultation (X) Laboratory studies [E]   
12/26/2019 10:13:09 EST by Aura Decker   
  RBC: 6.06 (H) HGB: 18.6 (H) HCT: 52.6 (H) Glu 236 Hem Aic 8.3 (X) Cardiac monitoring [F]   
12/26/2019 10:13:09 EST by Aura Decker   
  ordered (X) Imaging studies (ie, brain, intracranial vascular) [G]   
(X) Swallowing evaluation   
(X) Possible endovascular therapy [H] 12/26/2019 10:13:09 EST by Aura Decker   
  1. Right common femoral artery access 2. Left subclavian artery angiogram 
3. Left  vertebral artery  angiogram 
4. Endovascular mechanical thrombectomy,  left  vertebral artery 5. Angioplasty, left  vertebral artery Medications   
(X) Blood pressure control as indicated [I]   
(X) Possible antithrombotic therapy [J]   
12/26/2019 10:13:09 EST by Aura Decker   
  asa 325mg po qd Plavix 75mg po qd * Milestone Additional Notes  
 12/25/19 Pt w/ trouble speaking and extrem weakness. Pt states he feels like his right arm is getting better but that his leg (right) is on.  Patient and spouse deny prior occurrence.  The patient states he feels as though his right leg is not working right at this time. [facility] denies any headache or vision changes or recent changes in medications. [facility] does have a history of coronary artery disease with stent placement in the past. [facility] also has a history of diabetes and is not currently taking any anticoagulants.  
   
Vitals 98.5, 167/99, 17, 18, 96% on RA   
  
CTA Head 1. Partial occlusion of the basilar tip with thrombosis extending into and  
including the right superior cerebellar artery, as above. 2. Occlusion of the proximal left cervical vertebral artery to the level of the  
proximal V2 segment, as above. The remainder of the left vertebral artery is patent likely due to retrograde flow. 3. At least moderate stenosis of the cervical right vertebral artery origin. 4. At least moderate stenosis of the supraclinoid bilateral intracranial internal carotid arteries.  
   
CT Perf w/ cbf 1. Wedge-shaped perfusion defect in the superior right cerebellar hemisphere,  
with the Tmax greater than 6 seconds volume measuring 17.7 cc. There is no  
associated cerebral blood flow less than 30 percent. This is acute ischemia,  
with a mismatch volume of 17.7 cc.  
2. The above described perfusion defect corresponds to occlusion of the right  
superior cerebellar artery from a partially occlusive thrombus in the distal  
basilar artery. CT Head Within the limits of remaining intravenous contrast, there is no acute intracranial hemorrhage after recent neuro endovascular intervention. RBC: 6.06 (H) HGB: 18.6 (H) HCT: 52.6 (H) Glu 236 Hem Aic 8.3 EKG- NSR Meds- [id number] po qd, Lipitor 75mg po qhs, insulin r sc q6hrs, Zofran 8mg iv prn x1, Fentanyl 50mcg iv x2, Plavix 75mg po qd Plans- Admit IP ICU, neuro checks, npo, 2D echo,  bland, vascular access site assessment, scd/teds, NG tube, Cardiac monitoring, complete bed rest, PT/OT, speech, pulse ox  
  
   
Stroke: Ischemic - Clinical Indications for Admission to Inpatient Care by Madonna Schaffer RN  
 
   
Review Entered Review Status 12/26/2019 10:01 Completed  
   
Criteria Review Clinical Indications for Admission to Inpatient Care Most Recent : Marielle Gallagher Most Recent Date: 12/26/2019 10:01:29 EST   
(X) Admission is indicated for  1 or more  of the following  (1) (2) (3) (4):   
   (X) Acute ischemic stroke [A] [B]   
   12/26/2019 10:01:29 EST by Mitra Caceres  Pt w/ stroke symptoms and was found to have occlusion of his left vertebral artery. I am indicating him for emergent endovascular thrombectomy to try to restore blood flow to the brain.   Procedure note - by Luisa Cho RN  
 
   
Review Entered Review Status 12/26/2019 09:45 In Primary  
   
Criteria Review [address] [place] [address]  
  
NEUROVASCULAR SURGERY - OPERATIVE NOTE 
  
PRE-OPERATIVE DIAGNOSIS: 
1. Left vertebral artery occlusion 
  
POST-OPERATIVE DIAGNOSIS: 
1. Left vertebral artery occlusion 
  
PROCEDURE(S) PERFORMED: 
1. Right common femoral artery access 2. Left subclavian artery angiogram 
3. Left vertebral artery angiogram 
4. Endovascular mechanical thrombectomy, left vertebral artery 5. Angioplasty, left vertebral artery  Date of Service: 12/25/2019 
  
Surgeon(s): 
Darren Bowden MD 
  
Assistant(s): 
Elridge Simmonds, LUIS, FN-BC 
  
Anesthesia: Conscious sedation, 45 minutes. I personally supervised administration of sedation. 
  
Medications: No heparin used.  
  
Contrast: Omnipaque 120 mL 
  
Access: Right common femoral artery, 6F sheath. Arteriotomy closed with 8FAngioseal. Flat x 2 hours. 
  
Implants: none 
  INDICATIONS: Mr. Bell is a 59 y. o. year-old male who presents with stroke symptoms and was found to have occlusion of his left vertebral artery. I am indicating him for emergent endovascular thrombectomy to try to restore blood flow to the brain. I spoke to the patient and his family regarding the details of the procedure, the associated risks and potential complications, and the anticipated post-operative course. I answered their questions and they have elected to proceed. The surgical consent was signed and placed in the chart.  
  
FINDINGS: Acute occlusion of the origin of the left vertebral artery in the setting of a critical stenosis. This was easily revascularized with aspiration using a single pass to [address] [facility]. Once this was done, the 90% stenosis remained and this was opened with a 4 x 15 mm Viatrac balloon.  A small non-flow limiting dissection was noted after angioplasty. Good antegrade flow is demonstrated at the conclusion of the procedure. 
  
COMPLICATIONS: None. 
  
EQUIPMENT USED:  
1. 6F short sheath 2. 6F Flexor shuttle sheath, 90 cm 3. 5F diagnostic catheter, 125 cm 4. 0.038\" Glidewire, 260 cm 5. 0.035\" Glidewire, 150 cm 6. 4 x 15 mm Viatrac balloon 7. Synchro2 wire 8. 8F Angioseal 
  
PROCEDURAL DETAILS: After the patient was properly identified in the pre-operative area, they were transferred into the operating room and placed supine on the operating table. All pressure points were padded appropriately and the patient was connected to the physiologic monitoring system. 
  
After verifying the radiologic equipment to be in working order, the right and left groin areas were prepped and draped in the standard surgical fashion. 
  
A surgical team pause was then performed with all team members in agreement. 
  
After infiltration of local anesthetic, the right common femoral artery was then accessed with a 21 gauge needle. The arteriotomy was then dilated up to a 6F sheath, which was then flushed and attached to a pressurized heparinized saline drip. This was then exchanged for a 6F shuttle into the descending thoracic aorta and I flushed the system. Using the diagnostic catheter, the left subclavian artery was then catheterized. Digital subtraction angiography was then performed at this time of the cervical and intracranial  circulation, demonstrating the occlusion at the origin of the left vertebral artery. 
  
I then used the Glidewire and diagnostic catheter to gently pass through the occlusion and brought the shuttle to the face of the occlusion and performed aspiration. This opened up the vertebral artery and demonstrated the 90% stenosis at its origin. 
  
I was then able to cross the stenosis with a 4 x 15 mm Viatrac balloon over a Synchro2 wire and serially dilate the stenosis.  Post-angioplasty control angiography demonstrates a small non-flow limiting dissection with brisk antegrade flow. 
  
At the conclusion of the procedure, an external iliac angiogram was performed demonstrating a puncture site suitable for closure. The Angioseal was then deployed without difficulty. Adequate hemostasis was obtained and the wound was dressed in the standard sterile fashion. 
  
The patient tolerated the procedure well and was transported out of the operating room in stable condition. 
  
DIAGNOSTIC ANGIOGRAPHY AND INTERVENTION, SUPERVISION AND INTERPRETATION: 
  
LEFT SUBCLAVIAN ARTERY: Under fluoroscopic guidance, the diagnostic catheter was navigated from the aortic arch into the left subclavian artery and DSA performed in the AP plane. The left vertebral artery is not visualized, consistent with an acute occlusion. The thyrocervical and deep cervical trunks are visualized and without abnormality. 
  
LEFT VERTEBRAL ARTERY: Under fluoroscopic guidance, the diagnostic catheter was navigated into the left vertebral artery. Digital subtraction angiography was then performed. This demonstrates brisk antegrade flow into the intracranial circulation. No aneurysms are identified. There is no venous shunting. The anatomic course of the basilar artery and named cerebellar arteries appear normal. The left vertebral artery is dominant. The capillary phase and venous structures appear without significant abnormality.  
  
THROMBECTOMY: The aspiration catheter was brought to the face of the thrombus and aspiration performed. 
  
CONTROL ANGIOGRAPHY: 
1. First control angiogram from the left subclavian artery demonstrates antegrade flow and opening of the left vertebral artery but with a 90% stenosis at the origin. 2. The second control angiogram is performed after angioplasty demonstrating marked reduction of the stenosis and brisk antegrade flow.  There appears to be a small dissection stemming from the origin of the vertebral artery into the proximal V2 segment that is not flow limiting. 
  
ANGIOPLASTY: The stenosis is crossed with a Synchro2 wire and a 4 x 15 mm Viatrac balloon and angioplasty performed. 
  
EXTERNAL ILIAC ARTERY: The sheath is then pulled back into the external iliac artery and digital subtraction angiography is performed. This demonstrates an arteriotomy site that is above the femoral bifurcation and below the inferior epigastric artery. No extravasation is noted. No aneurysms are noted. The anatomy here is relatively free of significant atheromatous disease. The arteriotomy site appears amenable to the use of a closure device. 
  
 Mer Rowley MD, Milady Og, [facility] [address] [facility]

## 2020-01-29 NOTE — ADT AUTH CERT NOTES
[facility] Rama Cortez 19 Neurosurgery [facility] Physician Provider Summary Title Resident? Provider type MD No Physician Primary Contact Information Phone Fax E-mail Address [phone] Not available [address] [facility] [address] Specialties Neurosurgery     
      
Department Name [facility] NEUROENDOVASCULAR [id number] Nurse Navigator Assigned to Department Nurse Navigator Phone  
   
  
   
NPI AND UPIN  
 
NPI NPI  
NATIONAL PROVIDER ID [[id number] BRUNA - Damien Hitchcock [[id number]] [id number] PROVIDER LINK [[id number] [place] PROVIDER ID [[id number] Kathy Mcgee [[id number]] [id number]

## 2020-01-30 NOTE — ADT AUTH CERT NOTES
Stroke: Ischemic - Care Day 8 (1/1/2020) by Karyle Gent, RN  
 
   
Review Entered Review Status 1/30/2020 11:54 Completed  
   
Criteria Review Care Day: 8 Care Date: 1/1/2020 Level of Care: ICU Guideline Day 3 Clinical Status ( ) * Hemodynamic stability ( ) * Dangerous arrhythmia absent   
( ) * Mental status at baseline or stable and appropriate for next level of care ( ) * Neurologic deficits absent or stable and appropriate for next level of care ( ) * Adequate dietary intake ( ) * Discharge plans and education understood Activity ( ) * Up to chair   
( ) * Assisted ambulation as tolerated Routes ( ) * Oral hydration, medications, and diet Interventions ( ) * Supplemental oxygen absent or at baseline requirement Medications ( ) * Antithrombotic therapy appropriate for next level of care established [J] * Milestone Additional Notes 1/1  
97.7 98 19 96% 124/87  
  
1/1/2020 06:41 1/1/2020 11:03 1/1/2020 16:30 1/1/2020 20:19 GLUCOSE,FAST -  (H) 283 (H) 193 (H) 213 (H) Orders IP medical, Full Code, AC &HS BS, DM Diet, Meds Antivert 25 gm po x2, ASA 81 mg po x1 ,Lipitor 40 mg po x1, Plavix 75 mg po x1, Lovenox 40 mg sc x1, Reg insulin 2 units sc x2, Reg insulin 4 units x1,  reg insulin 6 units sc x1, reg insulin 6 unit sc x1, Lisinopril 20 mg po x1, Senna 17.2mg po x1,  Metformin 500 mg po x2, Neurosurgery Note No acute events overnight Physical Exam:   
General: Alert, cooperative, no distress, appears stated age. Eyes: PERRL +3, R 6th nerve improving. Neck: Supple, symmetrical, trachea midline, no adenopathy, thyroid: no enlargment/tenderness/nodules, no carotid bruit and no JVD. Lungs:   Clear to auscultation bilaterally. Heart: Regular rate and rhythm, S1, S2 normal, no murmur, click, rub or gallop. Abdomen:   Soft, non-tender. Bowel sounds normal. No masses,  No organomegaly. Extremities: Extremities normal, atraumatic, no cyanosis or edema. Pulses: 2+ and symmetric all extremities. Skin: Skin color, texture, turgor normal. No rashes or lesions. R groin CDI. Neurologic: AOX3, following commands. Maew this am. Speech improved.   
  
   
   
Active Problems:  
  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    
   
Principal Problem:  
  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    
   
Principal Problem:  
  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  
   
   
. 1. Pt pending IRC admit. 2. Continue PT/OT/ST. 3. OOB daily. 4. Check AR and OH in am.  
   
   
  
  
  
   
Stroke: Ischemic - Care Day 7 (12/31/2019) by Edel Han RN  
 
   
Review Entered Review Status 1/30/2020 11:46 Completed  
   
Criteria Review Care Day: 7 Care Date: 12/31/2019 Level of Care: ICU Guideline Day 3 Clinical Status ( ) * Hemodynamic stability ( ) * Dangerous arrhythmia absent   
( ) * Mental status at baseline or stable and appropriate for next level of care ( ) * Neurologic deficits absent or stable and appropriate for next level of care ( ) * Adequate dietary intake ( ) * Discharge plans and education understood Activity ( ) * Up to chair   
( ) * Assisted ambulation as tolerated Routes ( ) * Oral hydration, medications, and diet Interventions ( ) * Supplemental oxygen absent or at baseline requirement Medications ( ) * Antithrombotic therapy appropriate for next level of care established [J] * Milestone Additional Notes 12/31  
  
98.1 106 18 96% 125/70  
  
  
  
12/31/2019 07:54 12/31/2019 11:37 12/31/2019 16:23 12/31/2019 21:47 GLUCOSE,FAST -  (H) 206 (H) 210 (H) 274 (H) Orders IP medical, Full Code, AC &HS BS, DM Diet, Meds Antivert 25 gm po x2, ASA 81 mg po x1 ,Lipitor 40 mg po x1, Plavix 75 mg po x1, Lovenox 40 mg sc x1, Reg insulin 8 units sc x1, Reg insulin 4 units x2, reg insulin 6 unit sc x1, Lisinopril 20 mg po x1, Senna 17.2mg po x1,  Metformin 500 mg po x2, Neurosurgery Note No acute changes Physical Exam:   
General: Alert, cooperative, no distress, appears stated age. Eyes: PERRL +3, R 6th nerve improving. Neck: Supple, symmetrical, trachea midline, no adenopathy, thyroid: no enlargment/tenderness/nodules, no carotid bruit and no JVD. Lungs:   Clear to auscultation bilaterally. Heart: Regular rate and rhythm, S1, S2 normal, no murmur, click, rub or gallop. Abdomen:   Soft, non-tender. Bowel sounds normal. No masses,  No organomegaly. Extremities: Extremities normal, atraumatic, no cyanosis or edema. Pulses: 2+ and symmetric all extremities. Skin: Skin color, texture, turgor normal. No rashes or lesions. R groin CDI. Neurologic: AOX3, following commands. Maew this am. Speech improved.   
   
  
  
  
Active Problems:  
  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    
   
Active Problems:  
  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    
   
Principal Problem:  
  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    
   
Principal Problem:  
  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  
   
   
1. Pt pending IRC admit today or in am.  
2. Continue PT/OT/ST. 3. OOB daily.  
   
PT NOTE The patient is supine in bed upon contact and very agreeable to PT, stating he is eager to get up.  The patient is somewhat impulsive throughout treatment.  The patient performs supine to sit with supervision.   The patient scoots to the edge of the bed with supervision and performs sit to stand with CGA and verbal cues for proper hand placement on the bed instead of the walker.  The patient then ambulates [de-identified]' x2 with RW, CGA and verbal cues for walker management and safety awareness.  The patient is much mor steady than yesterday, but still demonstrates a narrow gait with increased trunk sway.  The patient returns to the edge of the bed and performs stand to sit with CGA.  After a seated rest break, the patient ambulates 20' with no AD and min assist for balance.  The patient then returns to the bed and sits with CGA.  Overall the patient is making good progress toward therapy goals as indicated by increased gait distances and decreased assistance level, but continues to have decreased functional balance and safety awareness.  Will continue skilled PT treatment as patient is still below functional baseline.   
   
Per initial:  
Mr. Nataly Morales is a 59 y.o. male in the hospital for CVA workup with finding of L vertebral artery occulusion.  Pt underwent mechanical endarterectomy 12/25/19 by Dr. Bradley Stearns.  Pt reports that he lives in a one story house with wife that has 3 steps to enter.   
   
   
   
  
This section established at most recent assessment PROBLEM LIST (Impairments causing functional limitations): 1. Decreased Strength 2. Decreased Transfer Abilities 3. Decreased Ambulation Ability/Technique 4. Decreased Balance 5. Decreased Activity Tolerance 6. Increased Fatigue INTERVENTIONS PLANNED: (Benefits and precautions of physical therapy have been discussed with the patient.) 1. Balance Exercise 2. Bed Mobility 3. Family Education 4. Gait Training 5. Home Exercise Program (HEP) 6. Neuromuscular Re-education/Strengthening 7. Therapeutic Activites 8. Therapeutic Exercise/Strengthening 9. Transfer Training  
   
TREATMENT PLAN: Frequency/Duration: 3 times a week for duration of hospital stay Rehabilitation Potential For Stated Goals: Good  
   
REHAB RECOMMENDATIONS (at time of discharge pending progress):    
Placement:   
It is my opinion, based on this patient's performance to date, that  Chris Scanlon may benefit from intensive therapy at an [address] [place] [address] after discharge due to potential to make ongoing and sustainable functional improvement that is of practical value. Marcelo Leach Equipment:   
\" None at this time ST NOTE Attempted to see patient. Michelle Mae with dinner tray and completed meal with no oropharyngeal dysphagia noted.  Patient had a room full of visitors so deferred and further treatment at this time  
  
   
Stroke: Ischemic - Care Day 6 (12/30/2019) by Hardik Han RN  
 
   
Review Entered Review Status 1/30/2020 11:40 Completed  
   
Criteria Review Care Day: 6 Care Date: 12/30/2019 Level of Care: ICU Guideline Day 3 Clinical Status ( ) * Hemodynamic stability ( ) * Dangerous arrhythmia absent   
( ) * Mental status at baseline or stable and appropriate for next level of care ( ) * Neurologic deficits absent or stable and appropriate for next level of care ( ) * Adequate dietary intake ( ) * Discharge plans and education understood Activity ( ) * Up to chair   
( ) * Assisted ambulation as tolerated Routes ( ) * Oral hydration, medications, and diet Interventions ( ) * Supplemental oxygen absent or at baseline requirement Medications ( ) * Antithrombotic therapy appropriate for next level of care established [J] * Milestone Additional Notes 12/30  
  
97.8 63 18 95% 158/84 Orders IP medical, Full Code, AC &HS BS, DM Diet, Meds Antivert 25 gm po x2,  mg po x1 ,Lipitor 40 mg po x1, Plavix 75 mg po x1, Lovenox 40 mg sc x1, Reg insulin 4 units x3, reg insulin 6 unit sc x1, Lisinopril 20 mg po x1, Senna 17.2mg po x1, Norco 7.5/325mg po x1, Neurosurgery Note Looks good this am. No acute changes Physical Exam:   
General: Alert, cooperative, no distress, appears stated age. Eyes: PERRL +3, R 6th nerve improving. Neck: Supple, symmetrical, trachea midline, no adenopathy, thyroid: no enlargment/tenderness/nodules, no carotid bruit and no JVD. Lungs:   Clear to auscultation bilaterally. Heart: Regular rate and rhythm, S1, S2 normal, no murmur, click, rub or gallop. Abdomen:   Soft, non-tender. Bowel sounds normal. No masses,  No organomegaly. Extremities: Extremities normal, atraumatic, no cyanosis or edema. Pulses: 2+ and symmetric all extremities. Skin: Skin color, texture, turgor normal. No rashes or lesions. R groin CDI. Neurologic: AOX3, following commands. Maew this am. Speech improved.   
   
  
  
  
Active Problems:  
  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    
   
Active Problems:  
  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    
   
Principal Problem:  
  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  
   
   
1. Pt pending IRC admit today or in am.  
2. Continue PT/OT/ST. 3. OOB daily.  
  
   
PT NOTE The patient is supine in bed upon contact and very agreeable to PT, stating he is eager to get up.  The patient is somewhat impulsive throughout treatment.  The patient performs supine to sit with SBA and verbal cues for safety awareness to prevent standing too soon.  The patient scoots to the edge of the bed with SBA and performs sit to stand with min assist and verbal cues for proper hand placement on the bed instead of the walker.  The patient then ambulates [de-identified]' x2 with RW, min assist, and constant verbal cues for proper walker management, posture, gait technique, and safety awareness.  The patient demonstrates an ataxic gait pattern and is unsteady on his feet with fair dynamic standing balance.  The patient then returns to the room and performs an uncontrolled descent into the recliner chair with min assist and verbal cues for hand placement.  The patient then performed sit to stand with min assist and participated in standing exercises and balance practice.  The patient then returned to the recliner chair and performed stand to sit with min assist and better control, following verbal cues for hand placement and control of descent.  The patient is very quick to start moving and shows signs of being unaware of his deficits.  Overall the patient is making good progress toward therapy goals as indicated by increased gait distances and decreased assistance level, but continues to have decreased functional balance and safety awareness.  Will continue skilled PT treatment as patient is still below functional baseline.   
   
Per initial:  
Mr. Quoc Santos is a 59 y.o. male in the hospital for CVA workup with finding of L vertebral artery occulusion.  Pt underwent mechanical endarterectomy 12/25/19 by Dr. Lisa Jin.  Pt reports that he lives in a one story house with wife that has 3 steps to enter.   
   
   
   
  
This section established at most recent assessment PROBLEM LIST (Impairments causing functional limitations): 1. Decreased Strength 2. Decreased Transfer Abilities 3. Decreased Ambulation Ability/Technique 4. Decreased Balance 5. Decreased Activity Tolerance 6. Increased Fatigue INTERVENTIONS PLANNED: (Benefits and precautions of physical therapy have been discussed with the patient.) 1. Balance Exercise 2. Bed Mobility 3. Family Education 4. Gait Training 5. Home Exercise Program (HEP) 6. Neuromuscular Re-education/Strengthening 7. Therapeutic Activites 8. Therapeutic Exercise/Strengthening 9. Transfer Training  
   
TREATMENT PLAN: Frequency/Duration: 3 times a week for duration of hospital stay Rehabilitation Potential For Stated Goals: Good  
   
REHAB RECOMMENDATIONS (at time of discharge pending progress):    
Placement: It is my opinion, based on this patient's performance to date, that Mr. Quoc Santos may benefit from intensive [facility] due to potential to make ongoing and sustainable functional improvement that is of practical value. Lili Harley Equipment:   
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 Hopefully we will hear back this afternoon and pt can transfer to [facility] later today or in a.m. Still requiring close cga and min assist with mobility due to ataxia and balance deficits. pts best chance of returning to work will require a more aggressive rehab approach than outpt or [facility] can provide.   
-cont Brilinta/Lipitor/ASA. BP controlled with Zestril A/P Principal Problem:  
  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  
Coordination of rehab/medical care Counseling of PM & R care issues management Monitoring and management of medical conditions per plan of care/orders Discussion with Family/Caregiver/Staff Reviewed Therapies/Labs/Medications/Records  
   
  
   
Stroke: Ischemic - Care Day 5 (12/29/2019) by Mason Han RN  
 
   
Review Entered Review Status 1/30/2020 11:34 Completed  
   
Criteria Review Care Day: 5 Care Date: 12/29/2019 Level of Care: ICU Guideline Day 3 Clinical Status ( ) * Hemodynamic stability ( ) * Dangerous arrhythmia absent   
( ) * Mental status at baseline or stable and appropriate for next level of care ( ) * Neurologic deficits absent or stable and appropriate for next level of care ( ) * Adequate dietary intake ( ) * Discharge plans and education understood Activity ( ) * Up to chair   
( ) * Assisted ambulation as tolerated Routes ( ) * Oral hydration, medications, and diet Interventions ( ) * Supplemental oxygen absent or at baseline requirement Medications ( ) * Antithrombotic therapy appropriate for next level of care established [J] * Milestone Additional Notes 12/29  
  
97.7 64 19 98% 139/82  
  
12/29/2019 05:51 Chloride 109 (H)  
CO2 27 Anion gap 7 Glucose 181 (H) Orders IP medical, Full Code, AC &HS BS, DM Diet, Meds Antivert 25 gm po x2,  mg po x1 ,Lipitor 40 mg po x1, Plavix 75 mg po x1, Lovenox 40 mg sc x1, Reg insulin 4 units x1, reg insulin 2 unit sc x3, Lisinopril 20 mg po x1, Senna 17.2mg po x1, Neurosurgery Note Looks good this am. No acute changes Physical Exam:   
General: Alert, cooperative, no distress, appears stated age. Eyes: PERRL +3, R 6th nerve improving. Neck: Supple, symmetrical, trachea midline, no adenopathy, thyroid: no enlargment/tenderness/nodules, no carotid bruit and no JVD. Lungs:   Clear to auscultation bilaterally. Heart: Regular rate and rhythm, S1, S2 normal, no murmur, click, rub or gallop. Abdomen:   Soft, non-tender. Bowel sounds normal. No masses,  No organomegaly. Extremities: Extremities normal, atraumatic, no cyanosis or edema. Pulses: 2+ and symmetric all extremities. Skin: Skin color, texture, turgor normal. No rashes or lesions. R groin CDI. Neurologic: AOX3, following commands. Maew this am. Speech improved.   
   
  
  
  
Active Problems:  
  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 [facility] (12/25/2019)    
   
Active Problems:  
  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 [address] [facility] (12/25/2019)  
   
   
1. Pt pending IRC admit on 12-20-19 2. Continue PT/OT/ST.   
3. OOB daily.   
  
  
   
Stroke: Ischemic - Care Day 4 (12/28/2019) by Nicole Patterson RN  
 
   
 Review Entered Review Status 1/30/2020 11:31 Completed  
   
Criteria Review Care Day: 4 Care Date: 12/28/2019 Level of Care: ICU Guideline Day 3 Clinical Status ( ) * Hemodynamic stability ( ) * Dangerous arrhythmia absent   
( ) * Mental status at baseline or stable and appropriate for next level of care ( ) * Neurologic deficits absent or stable and appropriate for next level of care ( ) * Adequate dietary intake ( ) * Discharge plans and education understood Activity ( ) * Up to chair   
( ) * Assisted ambulation as tolerated Routes ( ) * Oral hydration, medications, and diet Interventions ( ) * Supplemental oxygen absent or at baseline requirement Medications ( ) * Antithrombotic therapy appropriate for next level of care established [J] * Milestone Additional Notes 12/28  
  
97.5 64 19 95% 141/75  
  
12/28/2019 05:07 Chloride 112 (H) Anion gap 4 (L) Glucose 136 (H) Orders IP medical, Full Code, AC &HS BS, DM Diet Meds Antivert 25 gm po x4,  mg po x1 ,Lipitor 40 mg po x1, Plavix 75 mg po x1, Lovenox 40 mg sc x1, Reg insulin 4 units x3, reg insulin 2 unit sc x1, Lisinopril 20 mg po x1, Senna 17.2mg po x1, Neurosurgery Note Looks good this am. No acute changes Physical Exam:   
General: Alert, cooperative, no distress, appears stated age. Eyes: PERRL +3, R 6th nerve improving. Neck: Supple, symmetrical, trachea midline, no adenopathy, thyroid: no enlargment/tenderness/nodules, no carotid bruit and no JVD. Lungs:   Clear to auscultation bilaterally. Heart: Regular rate and rhythm, S1, S2 normal, no murmur, click, rub or gallop. Abdomen:   Soft, non-tender. Bowel sounds normal. No masses,  No organomegaly. Extremities: Extremities normal, atraumatic, no cyanosis or edema. Pulses: 2+ and symmetric all extremities. Skin: Skin color, texture, turgor normal. No rashes or lesions. R groin CDI. Neurologic: AOX3, following commands. Maew this am. Speech improved.   
   
  
  
  
Active Problems:  
  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  
   
   
1. Pt pending IRC admit on 12-20-19 2. Continue PT/OT/ST.   
3. OOB daily.

## 2020-03-02 ENCOUNTER — HOSPITAL ENCOUNTER (OUTPATIENT)
Dept: CT IMAGING | Age: 65
Discharge: HOME | End: 2020-03-02
Attending: NEUROLOGICAL SURGERY
Payer: COMMERCIAL

## 2020-03-02 DIAGNOSIS — I63.112: ICD-10-CM

## 2020-03-02 PROCEDURE — 70498 CT ANGIOGRAPHY NECK: CPT

## 2020-03-02 PROCEDURE — 74011000258 HC RX REV CODE- 258: Performed by: NEUROLOGICAL SURGERY

## 2020-03-02 PROCEDURE — 74011636320 HC RX REV CODE- 636/320: Performed by: NEUROLOGICAL SURGERY

## 2020-03-02 PROCEDURE — 70450 CT HEAD/BRAIN W/O DYE: CPT

## 2020-03-02 RX ORDER — SODIUM CHLORIDE 0.9 % (FLUSH) 0.9 %
10 SYRINGE (ML) INJECTION
Status: COMPLETED | OUTPATIENT
Start: 2020-03-02 | End: 2020-03-02

## 2020-03-02 RX ADMIN — SODIUM CHLORIDE 100 ML: 900 INJECTION, SOLUTION INTRAVENOUS at 09:57

## 2020-03-02 RX ADMIN — Medication 10 ML: at 09:57

## 2020-03-02 RX ADMIN — IOPAMIDOL 100 ML: 755 INJECTION, SOLUTION INTRAVENOUS at 09:57

## 2020-08-03 NOTE — PROGRESS NOTES
Clearance faxed as requested.   Discharge instructions and prescriptions given and reviewed with pt, verbalizes understanding, pt discharged home with family.

## 2020-09-18 ENCOUNTER — HOSPITAL ENCOUNTER (OUTPATIENT)
Dept: CT IMAGING | Age: 65
Discharge: HOME | End: 2020-09-18
Attending: NEUROLOGICAL SURGERY
Payer: COMMERCIAL

## 2020-09-18 DIAGNOSIS — I65.02: ICD-10-CM

## 2020-09-18 PROCEDURE — 74011000258 HC RX REV CODE- 258: Performed by: NEUROLOGICAL SURGERY

## 2020-09-18 PROCEDURE — 70498 CT ANGIOGRAPHY NECK: CPT

## 2020-09-18 PROCEDURE — 74011000636 HC RX REV CODE- 636: Performed by: NEUROLOGICAL SURGERY

## 2020-09-18 RX ORDER — SODIUM CHLORIDE 0.9 % (FLUSH) 0.9 %
5-10 SYRINGE (ML) INJECTION
Status: COMPLETED | OUTPATIENT
Start: 2020-09-18 | End: 2020-09-18

## 2020-09-18 RX ADMIN — SODIUM CHLORIDE 100 ML: 900 INJECTION, SOLUTION INTRAVENOUS at 08:58

## 2020-09-18 RX ADMIN — IOPAMIDOL 60 ML: 755 INJECTION, SOLUTION INTRAVENOUS at 08:58

## 2020-09-18 RX ADMIN — Medication 10 ML: at 08:58

## 2020-10-02 ENCOUNTER — HOSPITAL ENCOUNTER (OUTPATIENT)
Dept: LAB | Age: 65
Discharge: HOME | End: 2020-10-02
Payer: COMMERCIAL

## 2020-10-02 DIAGNOSIS — D47.1 MPN (MYELOPROLIFERATIVE NEOPLASM) (HCC): ICD-10-CM

## 2020-10-02 DIAGNOSIS — D75.1 POLYCYTHEMIA: ICD-10-CM

## 2020-10-02 LAB
25(OH)D3 SERPL-MCNC: 30.4 NG/ML (ref 30–100)
ALBUMIN SERPL-MCNC: 3.9 G/DL (ref 3.2–4.6)
ALBUMIN/GLOB SERPL: 1.3 {RATIO} (ref 1.2–3.5)
ALP SERPL-CCNC: 72 U/L (ref 50–136)
ALT SERPL-CCNC: 27 U/L (ref 12–65)
ANION GAP SERPL CALC-SCNC: 5 MMOL/L (ref 7–16)
AST SERPL-CCNC: 16 U/L (ref 15–37)
BASOPHILS # BLD: 0 K/UL (ref 0–0.2)
BASOPHILS NFR BLD: 1 % (ref 0–2)
BILIRUB SERPL-MCNC: 0.9 MG/DL (ref 0.2–1.1)
BUN SERPL-MCNC: 19 MG/DL (ref 8–23)
CALCIUM SERPL-MCNC: 8.9 MG/DL (ref 8.3–10.4)
CHLORIDE SERPL-SCNC: 106 MMOL/L (ref 98–107)
CO2 SERPL-SCNC: 26 MMOL/L (ref 21–32)
CREAT SERPL-MCNC: 1 MG/DL (ref 0.8–1.5)
DIFFERENTIAL METHOD BLD: NORMAL
EOSINOPHIL # BLD: 0.2 K/UL (ref 0–0.8)
EOSINOPHIL NFR BLD: 3 % (ref 0.5–7.8)
ERYTHROCYTE [DISTWIDTH] IN BLOOD BY AUTOMATED COUNT: 13.3 % (ref 11.9–14.6)
GLOBULIN SER CALC-MCNC: 2.9 G/DL (ref 2.3–3.5)
GLUCOSE SERPL-MCNC: 144 MG/DL (ref 65–100)
HCT VFR BLD AUTO: 47.7 % (ref 41.1–50.3)
HGB BLD-MCNC: 16.6 G/DL (ref 13.6–17.2)
HGB RETIC QN AUTO: 36 PG (ref 29–35)
IMM GRANULOCYTES # BLD AUTO: 0 K/UL (ref 0–0.5)
IMM GRANULOCYTES NFR BLD AUTO: 1 % (ref 0–5)
IMM RETICS NFR: 7.7 % (ref 2.3–13.4)
LYMPHOCYTES # BLD: 1.7 K/UL (ref 0.5–4.6)
LYMPHOCYTES NFR BLD: 31 % (ref 13–44)
MCH RBC QN AUTO: 30.5 PG (ref 26.1–32.9)
MCHC RBC AUTO-ENTMCNC: 34.8 G/DL (ref 31.4–35)
MCV RBC AUTO: 87.5 FL (ref 79.6–97.8)
MONOCYTES # BLD: 0.4 K/UL (ref 0.1–1.3)
MONOCYTES NFR BLD: 8 % (ref 4–12)
NEUTS SEG # BLD: 3.2 K/UL (ref 1.7–8.2)
NEUTS SEG NFR BLD: 58 % (ref 43–78)
NRBC # BLD: 0 K/UL (ref 0–0.2)
PLATELET # BLD AUTO: 175 K/UL (ref 150–450)
PMV BLD AUTO: 9.9 FL (ref 9.4–12.3)
POTASSIUM SERPL-SCNC: 4.2 MMOL/L (ref 3.5–5.1)
PROT SERPL-MCNC: 6.8 G/DL (ref 6.3–8.2)
RBC # BLD AUTO: 5.45 M/UL (ref 4.23–5.67)
REFERENCE LAB,REFLB: NORMAL
RETICS # AUTO: 0.1 M/UL (ref 0.03–0.1)
RETICS/RBC NFR AUTO: 1.8 % (ref 0.3–2)
SODIUM SERPL-SCNC: 137 MMOL/L (ref 136–145)
TEST DESCRIPTION:,ATST: NORMAL
VIT B12 SERPL-MCNC: 643 PG/ML (ref 193–986)
WBC # BLD AUTO: 5.6 K/UL (ref 4.3–11.1)

## 2020-10-02 PROCEDURE — 80053 COMPREHEN METABOLIC PANEL: CPT

## 2020-10-02 PROCEDURE — 36415 COLL VENOUS BLD VENIPUNCTURE: CPT

## 2020-10-02 PROCEDURE — 85025 COMPLETE CBC W/AUTO DIFF WBC: CPT

## 2020-10-02 PROCEDURE — 86334 IMMUNOFIX E-PHORESIS SERUM: CPT

## 2020-10-02 PROCEDURE — 84165 PROTEIN E-PHORESIS SERUM: CPT

## 2020-10-02 PROCEDURE — 82375 ASSAY CARBOXYHB QUANT: CPT

## 2020-10-02 PROCEDURE — 85046 RETICYTE/HGB CONCENTRATE: CPT

## 2020-10-02 PROCEDURE — 82607 VITAMIN B-12: CPT

## 2020-10-02 PROCEDURE — 82306 VITAMIN D 25 HYDROXY: CPT

## 2020-10-02 PROCEDURE — 82668 ASSAY OF ERYTHROPOIETIN: CPT

## 2020-10-03 LAB — EPO SERPL-ACNC: 7.9 MIU/ML (ref 2.6–18.5)

## 2020-10-05 LAB — COHGB MFR BLD: 3.1 % (ref 0–3.6)

## 2020-10-06 LAB
ALBUMIN SERPL ELPH-MCNC: 4.11 G/DL (ref 3.2–5.6)
ALBUMIN/GLOB SERPL: 1.7 {RATIO}
ALPHA1 GLOB SERPL ELPH-MCNC: 0.15 G/DL (ref 0.1–0.4)
ALPHA2 GLOB SERPL ELPH-MCNC: 0.65 G/DL (ref 0.4–1.2)
B-GLOBULIN SERPL QL ELPH: 0.92 G/DL (ref 0.6–1.3)
GAMMA GLOB MFR SERPL ELPH: 0.77 G/DL (ref 0.5–1.6)
IGA SERPL-MCNC: 176 MG/DL (ref 85–499)
IGG SERPL-MCNC: 699 MG/DL (ref 610–1616)
IGM SERPL-MCNC: 29 MG/DL (ref 35–242)
M PROTEIN SERPL ELPH-MCNC: ABNORMAL G/DL
PROT PATTERN SERPL ELPH-IMP: ABNORMAL
PROT PATTERN SPEC IFE-IMP: ABNORMAL
PROT SERPL-MCNC: 6.6 G/DL (ref 6.3–8.2)

## 2020-10-30 ENCOUNTER — HOSPITAL ENCOUNTER (OUTPATIENT)
Dept: LAB | Age: 65
Discharge: HOME | End: 2020-10-30
Payer: COMMERCIAL

## 2020-10-30 DIAGNOSIS — D47.1 MPN (MYELOPROLIFERATIVE NEOPLASM) (HCC): ICD-10-CM

## 2020-10-30 LAB
ALBUMIN SERPL-MCNC: 3.6 G/DL (ref 3.2–4.6)
ALBUMIN/GLOB SERPL: 1.2 {RATIO} (ref 1.2–3.5)
ALP SERPL-CCNC: 80 U/L (ref 50–136)
ALT SERPL-CCNC: 25 U/L (ref 12–65)
ANION GAP SERPL CALC-SCNC: 6 MMOL/L (ref 7–16)
AST SERPL-CCNC: 14 U/L (ref 15–37)
BASOPHILS # BLD: 0 K/UL (ref 0–0.2)
BASOPHILS NFR BLD: 1 % (ref 0–2)
BILIRUB SERPL-MCNC: 0.9 MG/DL (ref 0.2–1.1)
BUN SERPL-MCNC: 20 MG/DL (ref 8–23)
CALCIUM SERPL-MCNC: 8.8 MG/DL (ref 8.3–10.4)
CHLORIDE SERPL-SCNC: 106 MMOL/L (ref 98–107)
CO2 SERPL-SCNC: 28 MMOL/L (ref 21–32)
CREAT SERPL-MCNC: 1.2 MG/DL (ref 0.8–1.5)
DIFFERENTIAL METHOD BLD: NORMAL
EOSINOPHIL # BLD: 0.2 K/UL (ref 0–0.8)
EOSINOPHIL NFR BLD: 3 % (ref 0.5–7.8)
ERYTHROCYTE [DISTWIDTH] IN BLOOD BY AUTOMATED COUNT: 13.1 % (ref 11.9–14.6)
GLOBULIN SER CALC-MCNC: 3 G/DL (ref 2.3–3.5)
GLUCOSE SERPL-MCNC: 186 MG/DL (ref 65–100)
HCT VFR BLD AUTO: 48.3 % (ref 41.1–50.3)
HGB BLD-MCNC: 16.4 G/DL (ref 13.6–17.2)
IMM GRANULOCYTES # BLD AUTO: 0 K/UL (ref 0–0.5)
IMM GRANULOCYTES NFR BLD AUTO: 1 % (ref 0–5)
LYMPHOCYTES # BLD: 1.7 K/UL (ref 0.5–4.6)
LYMPHOCYTES NFR BLD: 27 % (ref 13–44)
MCH RBC QN AUTO: 29.8 PG (ref 26.1–32.9)
MCHC RBC AUTO-ENTMCNC: 34 G/DL (ref 31.4–35)
MCV RBC AUTO: 87.8 FL (ref 79.6–97.8)
MONOCYTES # BLD: 0.5 K/UL (ref 0.1–1.3)
MONOCYTES NFR BLD: 8 % (ref 4–12)
NEUTS SEG # BLD: 4 K/UL (ref 1.7–8.2)
NEUTS SEG NFR BLD: 62 % (ref 43–78)
NRBC # BLD: 0 K/UL (ref 0–0.2)
PLATELET # BLD AUTO: 173 K/UL (ref 150–450)
PMV BLD AUTO: 9.7 FL (ref 9.4–12.3)
POTASSIUM SERPL-SCNC: 4.2 MMOL/L (ref 3.5–5.1)
PROT SERPL-MCNC: 6.6 G/DL (ref 6.3–8.2)
RBC # BLD AUTO: 5.5 M/UL (ref 4.23–5.67)
SODIUM SERPL-SCNC: 140 MMOL/L (ref 136–145)
WBC # BLD AUTO: 6.4 K/UL (ref 4.3–11.1)

## 2020-10-30 PROCEDURE — 36415 COLL VENOUS BLD VENIPUNCTURE: CPT

## 2020-10-30 PROCEDURE — 85025 COMPLETE CBC W/AUTO DIFF WBC: CPT

## 2020-10-30 PROCEDURE — 80053 COMPREHEN METABOLIC PANEL: CPT

## 2023-06-01 NOTE — PROGRESS NOTES
Patient HOB elevated at this time and STAND screen performed. Patient passed dysphagia screening. Patient is able to swallow PO meds. None